# Patient Record
Sex: FEMALE | Race: WHITE | NOT HISPANIC OR LATINO | Employment: OTHER | URBAN - METROPOLITAN AREA
[De-identification: names, ages, dates, MRNs, and addresses within clinical notes are randomized per-mention and may not be internally consistent; named-entity substitution may affect disease eponyms.]

---

## 2017-03-16 ENCOUNTER — HOSPITAL ENCOUNTER (OUTPATIENT)
Dept: MAMMOGRAPHY | Facility: MEDICAL CENTER | Age: 57
Discharge: HOME | End: 2017-03-16
Payer: COMMERCIAL

## 2017-03-16 DIAGNOSIS — Z12.31 VISIT FOR SCREENING MAMMOGRAM: ICD-10-CM

## 2017-03-16 PROCEDURE — 77063 BREAST TOMOSYNTHESIS BI: CPT

## 2017-03-16 PROCEDURE — G0202 SCR MAMMO BI INCL CAD: HCPCS

## 2017-06-09 ENCOUNTER — TRANSCRIBE ORDERS (OUTPATIENT)
Dept: ADMINISTRATIVE | Facility: HOSPITAL | Age: 57
End: 2017-06-09

## 2017-06-09 ENCOUNTER — HOSPITAL ENCOUNTER (OUTPATIENT)
Dept: RADIOLOGY | Facility: MEDICAL CENTER | Age: 57
Discharge: HOME | End: 2017-06-09
Payer: COMMERCIAL

## 2017-06-09 DIAGNOSIS — R07.81 PLEURITIC CHEST PAIN: Primary | ICD-10-CM

## 2017-06-09 DIAGNOSIS — R07.81 PLEURITIC CHEST PAIN: ICD-10-CM

## 2017-06-09 PROCEDURE — 71111 X-RAY EXAM RIBS/CHEST4/> VWS: CPT

## 2017-09-26 ENCOUNTER — GENERIC CONVERSION - ENCOUNTER (OUTPATIENT)
Dept: OTHER | Facility: OTHER | Age: 57
End: 2017-09-26

## 2017-09-26 DIAGNOSIS — C64.1 MALIGNANT NEOPLASM OF RIGHT KIDNEY, EXCEPT RENAL PELVIS (HCC): ICD-10-CM

## 2017-09-26 LAB
BILIRUB UR QL STRIP: NEGATIVE
CLARITY UR: NORMAL
COLOR UR: YELLOW
GLUCOSE (HISTORICAL): NEGATIVE
HGB UR QL STRIP.AUTO: NEGATIVE
KETONES UR STRIP-MCNC: NORMAL MG/DL
LEUKOCYTE ESTERASE UR QL STRIP: NEGATIVE
NITRITE UR QL STRIP: NEGATIVE
PH UR STRIP.AUTO: 7 [PH]
PROT UR STRIP-MCNC: NEGATIVE MG/DL
SP GR UR STRIP.AUTO: 1.01
UROBILINOGEN UR QL STRIP.AUTO: 0.2

## 2018-01-12 VITALS
BODY MASS INDEX: 20.41 KG/M2 | DIASTOLIC BLOOD PRESSURE: 72 MMHG | WEIGHT: 127 LBS | SYSTOLIC BLOOD PRESSURE: 124 MMHG | HEIGHT: 66 IN

## 2018-03-26 ENCOUNTER — OFFICE VISIT (OUTPATIENT)
Dept: UROLOGY | Facility: MEDICAL CENTER | Age: 58
End: 2018-03-26
Payer: COMMERCIAL

## 2018-03-26 ENCOUNTER — TELEPHONE (OUTPATIENT)
Dept: UROLOGY | Facility: AMBULATORY SURGERY CENTER | Age: 58
End: 2018-03-26

## 2018-03-26 VITALS
SYSTOLIC BLOOD PRESSURE: 120 MMHG | DIASTOLIC BLOOD PRESSURE: 70 MMHG | HEIGHT: 67 IN | WEIGHT: 128 LBS | BODY MASS INDEX: 20.09 KG/M2

## 2018-03-26 DIAGNOSIS — C64.1 RENAL CELL CARCINOMA OF RIGHT KIDNEY (HCC): ICD-10-CM

## 2018-03-26 DIAGNOSIS — N30.20 CHRONIC CYSTITIS: ICD-10-CM

## 2018-03-26 DIAGNOSIS — N39.0 URINARY TRACT INFECTION WITHOUT HEMATURIA, SITE UNSPECIFIED: Primary | ICD-10-CM

## 2018-03-26 LAB
SL AMB  POCT GLUCOSE, UA: 100
SL AMB LEUKOCYTE ESTERASE,UA: ABNORMAL
SL AMB POCT BILIRUBIN,UA: ABNORMAL
SL AMB POCT BLOOD,UA: ABNORMAL
SL AMB POCT CLARITY,UA: CLEAR
SL AMB POCT COLOR,UA: ABNORMAL
SL AMB POCT KETONES,UA: ABNORMAL
SL AMB POCT NITRITE,UA: ABNORMAL
SL AMB POCT PH,UA: 5.5
SL AMB POCT SPECIFIC GRAVITY,UA: 1.01
SL AMB POCT URINE PROTEIN: 300
SL AMB POCT UROBILINOGEN: 1

## 2018-03-26 PROCEDURE — 81003 URINALYSIS AUTO W/O SCOPE: CPT | Performed by: UROLOGY

## 2018-03-26 PROCEDURE — 99213 OFFICE O/P EST LOW 20 MIN: CPT | Performed by: UROLOGY

## 2018-03-26 RX ORDER — DIAPER,BRIEF,YOUTH,DISPOSABLE
EACH MISCELLANEOUS
COMMUNITY

## 2018-03-26 RX ORDER — LEVOTHYROXINE SODIUM 50 UG/1
CAPSULE ORAL
COMMUNITY
Start: 2018-02-20 | End: 2020-03-04

## 2018-03-26 RX ORDER — DIPHENHYDRAMINE HYDROCHLORIDE 25 MG/1
5 TABLET ORAL
COMMUNITY
End: 2020-03-04

## 2018-03-26 RX ORDER — CEFUROXIME AXETIL 250 MG/1
250 TABLET ORAL EVERY 12 HOURS SCHEDULED
COMMUNITY
End: 2019-01-10 | Stop reason: ALTCHOICE

## 2018-03-26 RX ORDER — MAG HYDROX/ALUMINUM HYD/SIMETH 400-400-40
SUSPENSION, ORAL (FINAL DOSE FORM) ORAL
COMMUNITY
End: 2018-05-14 | Stop reason: DRUGHIGH

## 2018-03-26 RX ORDER — CEPHALEXIN 250 MG/1
250 CAPSULE ORAL DAILY
Qty: 90 CAPSULE | Refills: 1 | Status: SHIPPED | OUTPATIENT
Start: 2018-03-26 | End: 2018-06-24

## 2018-03-26 NOTE — LETTER
2018     Kody Wong MD  [address]  [address] [facility] [facility] [address]    Patient: Ann House   YOB: 1960   Date of Visit: 3/26/2018       Dear Dr Wanda Dickson: Thank you for referring Ann House to me for evaluation  Below are my notes for this consultation  If you have questions, please do not hesitate to call me  I look forward to following your patient along with you  Sincerely,        Dereck Greenberg MD        CC: MD Dereck Acosta MD  3/26/2018  2:10 PM  Sign at close encounter  [address] [facility]  [place] [phone]  www  [facility]  org      NAME: Ann House  AGE: 63 y o  SEX: female  : 1960   MRN: [id number]    DATE: 3/26/2018  TIME: 1:27 PM    Assessment and Plan  :RCC right kidney- s/p cryo 3/2/2017, doing well- check ROCHELLE and send results to pt  Chronic cystitis- finish Ceftin, start prophylactic Keflex 250mg daily after that finishes for 6 months  Perform cysto in 6 weeks  Chief Complaint   No chief complaint on file  History of Present Illness   Right renal cell carcinoma status post cryoablation in interventional radiology at [facility] 3/2/2017  It was a 1 1 cm mass  Has a UTI- just seen a patient first- 3rd UTI in a Month- she finishes abx and it comes right back  When she starts abx, her sx are relieved in about 24hrs  Sx of infection- dysuria, pressure, general discomfort  E coli UTIs  Not post coital  Has been having some intermittent diarhea for the past 6 months  Had a UTI 1-2 weeks before [place]  Urine culture 3/3/2018 showed E coli greater than 100,000 colonies sensitive to cefazolin, Macrobid and Augmentin  Resistant to Cipro Levaquin and Bactrim, as well as gentamicin  She is currently on Ceftin        The following portions of the patient's history were reviewed and updated as appropriate: allergies, current medications, past family history, past medical history, past social history, past surgical history and problem list     Review of Systems   Review of Systems    Active Problem List   There is no problem list on file for this patient  Objective   There were no vitals taken for this visit  Physical Exam        Current Medications     Current Outpatient Prescriptions:     levothyroxine (SYNTHROID) 75 mcg tablet, Take 75 mcg by mouth daily  , Disp: , Rfl:     LORazepam (ATIVAN) 1 mg tablet, Take 1 mg by mouth every 6 (six) hours as needed for anxiety  , Disp: , Rfl:         Jake Herrera MD

## 2018-03-26 NOTE — TELEPHONE ENCOUNTER
Patient called would like to have ct done then schedule a visit to see the doctor  Is having some uti issues that she is following up with at patient 1st  She would like a call once ct is put in chart  She can be reached at [phone]

## 2018-03-26 NOTE — TELEPHONE ENCOUNTER
Pt is on her 3rd round of ABX's, wants to be seen by Dr Rosibel Obrien  Appt made for today  She will also need a Ct Scan order for 6mo  check  Her last Ct Scan was 10/10/17

## 2018-03-26 NOTE — PROGRESS NOTES
[address] [facility]  [facility]  [address] [facility], [address] [place] [address]  [phone]  www  [facility]  org      NAME: Silverio Reyna  AGE: 63 y o  SEX: female  : 1960   MRN: [id number]    DATE: 3/26/2018  TIME: 1:27 PM    Assessment and Plan  :RCC right kidney- s/p cryo 3/2/2017, doing well- check ROCHELLE and send results to pt  Chronic cystitis- finish Ceftin, start prophylactic Keflex 250mg daily after that finishes for 6 months  Perform cysto in 6 weeks  She requests IV Versed for this, and I told her to make sure she brings a   Chief Complaint   No chief complaint on file  History of Present Illness   Right renal cell carcinoma status post cryoablation in interventional radiology [address] 3/2/2017  It was a 1 1 cm mass  Has a UTI- just seen a patient first- 3rd UTI in a Month- she finishes abx and it comes right back  When she starts abx, her sx are relieved in about 24hrs  Sx of infection- dysuria, pressure, general discomfort  E coli UTIs  Not post coital  Has been having some intermittent diarhea for the past 6 months  Had a UTI 1-2 weeks before [place]  Urine culture 3/3/2018 showed E coli greater than 100,000 colonies sensitive to cefazolin, Macrobid and Augmentin  Resistant to Cipro Levaquin and Bactrim, as well as gentamicin  She is currently on Ceftin  The following portions of the patient's history were reviewed and updated as appropriate: allergies, current medications, past family history, past medical history, past social history, past surgical history and problem list     Review of Systems   Review of Systems    Active Problem List   There is no problem list on file for this patient  Objective   There were no vitals taken for this visit  Physical Exam        Current Medications     Current Outpatient Prescriptions:     levothyroxine (SYNTHROID) 75 mcg tablet, Take 75 mcg by mouth daily  , Disp: , Rfl:   LORazepam (ATIVAN) 1 mg tablet, Take 1 mg by mouth every 6 (six) hours as needed for anxiety  , Disp: , Rfl:         Imelda Washburn MD

## 2018-04-10 ENCOUNTER — HOSPITAL ENCOUNTER (OUTPATIENT)
Dept: ULTRASOUND IMAGING | Facility: MEDICAL CENTER | Age: 58
Discharge: HOME | End: 2018-04-10
Attending: UROLOGY
Payer: COMMERCIAL

## 2018-04-10 DIAGNOSIS — C64.1 RENAL CELL CARCINOMA OF RIGHT KIDNEY (HCC): ICD-10-CM

## 2018-04-10 PROCEDURE — 76770 US EXAM ABDO BACK WALL COMP: CPT

## 2018-05-09 RX ORDER — ESCITALOPRAM OXALATE 10 MG/1
10 TABLET ORAL
COMMUNITY
Start: 2018-04-03 | End: 2019-01-10 | Stop reason: ALTCHOICE

## 2018-05-09 RX ORDER — ZOLPIDEM TARTRATE 10 MG/1
10 TABLET ORAL DAILY
COMMUNITY
Start: 2018-04-03

## 2018-05-14 ENCOUNTER — PROCEDURE VISIT (OUTPATIENT)
Dept: UROLOGY | Facility: MEDICAL CENTER | Age: 58
End: 2018-05-14
Payer: COMMERCIAL

## 2018-05-14 VITALS
HEIGHT: 65 IN | BODY MASS INDEX: 21.83 KG/M2 | DIASTOLIC BLOOD PRESSURE: 80 MMHG | SYSTOLIC BLOOD PRESSURE: 138 MMHG | WEIGHT: 131 LBS

## 2018-05-14 DIAGNOSIS — R10.2 PELVIC PAIN: ICD-10-CM

## 2018-05-14 DIAGNOSIS — N95.2 ATROPHIC VAGINITIS: ICD-10-CM

## 2018-05-14 DIAGNOSIS — C64.1 RENAL CELL CARCINOMA OF RIGHT KIDNEY (HCC): Primary | ICD-10-CM

## 2018-05-14 DIAGNOSIS — N30.20 CHRONIC CYSTITIS: ICD-10-CM

## 2018-05-14 LAB
SL AMB  POCT GLUCOSE, UA: NEGATIVE
SL AMB LEUKOCYTE ESTERASE,UA: NEGATIVE
SL AMB POCT BILIRUBIN,UA: NEGATIVE
SL AMB POCT BLOOD,UA: NEGATIVE
SL AMB POCT CLARITY,UA: CLEAR
SL AMB POCT COLOR,UA: NORMAL
SL AMB POCT KETONES,UA: NEGATIVE
SL AMB POCT NITRITE,UA: NEGATIVE
SL AMB POCT PH,UA: 7.5
SL AMB POCT SPECIFIC GRAVITY,UA: 1.01
SL AMB POCT URINE PROTEIN: NEGATIVE
SL AMB POCT UROBILINOGEN: 0.2

## 2018-05-14 PROCEDURE — 99212 OFFICE O/P EST SF 10 MIN: CPT | Performed by: UROLOGY

## 2018-05-14 PROCEDURE — 81003 URINALYSIS AUTO W/O SCOPE: CPT | Performed by: UROLOGY

## 2018-05-14 RX ORDER — ESTRADIOL 0.1 MG/G
1 CREAM VAGINAL EVERY OTHER DAY
Qty: 42.5 G | Refills: 0 | Status: SHIPPED | OUTPATIENT
Start: 2018-05-14 | End: 2020-09-15

## 2018-05-14 RX ADMIN — MIDAZOLAM HYDROCHLORIDE 4 MG: 5 INJECTION INTRAMUSCULAR; INTRAVENOUS at 16:33

## 2018-05-14 NOTE — PROGRESS NOTES
[address] [facility]  [facility]  [address] [facility]  [place], [address] [place] [address]  [phone]  www  [facility]  org      NAME: Becky Ulloa  AGE: 63 y o  SEX: female  : 1960   MRN: [id number]    DATE: 2018  TIME: 4:35 PM    Assessment and Plan: Start Estrace 1 dab to urethra QOD  Continue with Keflex prophylaxis  See me in 6 months with a renal ultrasound  Chief Complaint     Chief Complaint   Patient presents with    Cystoscopy    Chronic cystitis       History of Present Illness   Chronic cystitis:  Here for cystoscopy  Requires IV Versed  Renal cell carcinoma the right kidney status post cryoablation in 3/2/2017-renal ultrasound shows no recurrence  Cystoscopy Procedure Note        Pre-operative Diagnosis:  Chronic cystitis    Post-operative Diagnosis:  Chronic cystitis, normal bladder on cystoscopy    Procedure: Flexible cystoscopy    Surgeon: Kevin Collado MD    Anesthesia: 1% Xylocaine per urethra/4 mg of Versed given into the right antecubital fossa vein intravenously for sedation    EBL: Minimal    Complications: none    Procedure Details   The risks, benefits, complications, treatment options, and expected outcomes were discussed with the patient  The patient concurred with the proposed plan, giving informed consent  Patient requested intravenous Versed to be given  Using a 25 gauge butterfly needle, I injected 0 8 cc/4 mg of Versed into the right antecubital fossa vein  This gave good sedation and she was conversant the entire procedure  Cystoscopy was performed today under local anesthesia, using sterile technique  The patient was placed in the dorsal lithotomy position, prepped with Betadine, and draped in the usual sterile fashion   The flexible cystocope was used to inspect both the urethra and bladder    Findings:  Urethra:  Normal without stricture, but quite tender    Bladder:  Smooth, not trabeculated and there were no stones tumors or other lesions  The orifices were orthotopic and intact  Specimens:  None                 Complications:  None           Disposition: To home            Condition:  Stable          The following portions of the patient's history were reviewed and updated as appropriate: allergies, current medications, past family history, past medical history, past social history, past surgical history and problem list     Review of Systems   Review of Systems    Active Problem List   There is no problem list on file for this patient  Objective   /80   Ht 5' 5 25" (1 657 m)   Wt 59 4 kg (131 lb)   BMI 21 63 kg/m²     Physical Exam        Current Medications     Current Outpatient Prescriptions:     Biotin 5 MG CAPS, Take 5 mg by mouth, Disp: , Rfl:     cephalexin (KEFLEX) 250 mg capsule, Take 1 capsule (250 mg total) by mouth daily for 90 days, Disp: 90 capsule, Rfl: 1    Cholecalciferol 2000 units CAPS, Take 1 capsule by mouth, Disp: , Rfl:     escitalopram (LEXAPRO) 10 mg tablet, Take 10 mg by mouth, Disp: , Rfl:     LORazepam (ATIVAN) 1 mg tablet, Take 1 mg by mouth every 6 (six) hours as needed for anxiety  , Disp: , Rfl:     Magnesium 100 MG TABS, Take by mouth, Disp: , Rfl:     MULTIPLE VITAMIN PO, Take by mouth, Disp: , Rfl:     Omega-3 Fatty Acids (EQL OMEGA 3 FISH OIL) 1200 MG CAPS, Take by mouth, Disp: , Rfl:     TIROSINT 50 MCG CAPS, , Disp: , Rfl:     cefuroxime (CEFTIN) 250 mg tablet, Take 250 mg by mouth every 12 (twelve) hours, Disp: , Rfl:     zolpidem (AMBIEN) 10 mg tablet, Take 10 mg by mouth daily, Disp: , Rfl:         Amanda Coello MD

## 2018-05-15 RX ORDER — MIDAZOLAM HYDROCHLORIDE 5 MG/ML
4 INJECTION INTRAMUSCULAR; INTRAVENOUS ONCE
Status: COMPLETED | OUTPATIENT
Start: 2018-05-15 | End: 2018-05-14

## 2018-05-30 ENCOUNTER — TRANSCRIBE ORDERS (OUTPATIENT)
Dept: ADMINISTRATIVE | Facility: HOSPITAL | Age: 58
End: 2018-05-30

## 2018-05-30 DIAGNOSIS — Z12.39 SCREENING BREAST EXAMINATION: Primary | ICD-10-CM

## 2018-06-13 ENCOUNTER — HOSPITAL ENCOUNTER (OUTPATIENT)
Dept: ULTRASOUND IMAGING | Facility: MEDICAL CENTER | Age: 58
Discharge: HOME | End: 2018-06-13
Attending: UROLOGY
Payer: COMMERCIAL

## 2018-06-13 ENCOUNTER — HOSPITAL ENCOUNTER (OUTPATIENT)
Dept: MAMMOGRAPHY | Facility: MEDICAL CENTER | Age: 58
Discharge: HOME | End: 2018-06-13
Payer: COMMERCIAL

## 2018-06-13 DIAGNOSIS — Z12.39 SCREENING BREAST EXAMINATION: ICD-10-CM

## 2018-06-13 DIAGNOSIS — R10.2 PELVIC PAIN: ICD-10-CM

## 2018-06-13 PROCEDURE — 77063 BREAST TOMOSYNTHESIS BI: CPT

## 2018-06-13 PROCEDURE — 76856 US EXAM PELVIC COMPLETE: CPT

## 2018-06-13 PROCEDURE — 76830 TRANSVAGINAL US NON-OB: CPT

## 2018-06-13 PROCEDURE — 77067 SCR MAMMO BI INCL CAD: CPT

## 2018-11-15 ENCOUNTER — TELEPHONE (OUTPATIENT)
Dept: UROLOGY | Facility: AMBULATORY SURGERY CENTER | Age: 58
End: 2018-11-15

## 2018-11-15 NOTE — TELEPHONE ENCOUNTER
LMOM, there is a US order in pt's chart from 5/14/18  If she needs it printed to go somewhere other than a [address] [place] [address] facility, please call back

## 2018-11-15 NOTE — TELEPHONE ENCOUNTER
Patient called and said she needs to get order for ultrasound prior to her appointment on 12/06/18    Requesting a call back

## 2018-12-13 ENCOUNTER — HOSPITAL ENCOUNTER (OUTPATIENT)
Dept: ULTRASOUND IMAGING | Facility: MEDICAL CENTER | Age: 58
Discharge: HOME | End: 2018-12-13
Attending: UROLOGY
Payer: COMMERCIAL

## 2018-12-13 DIAGNOSIS — C64.1 RENAL CELL CARCINOMA OF RIGHT KIDNEY (HCC): ICD-10-CM

## 2018-12-13 PROCEDURE — 76770 US EXAM ABDO BACK WALL COMP: CPT

## 2019-01-10 ENCOUNTER — OFFICE VISIT (OUTPATIENT)
Dept: UROLOGY | Facility: MEDICAL CENTER | Age: 59
End: 2019-01-10
Payer: COMMERCIAL

## 2019-01-10 VITALS
WEIGHT: 135 LBS | HEIGHT: 67 IN | BODY MASS INDEX: 21.19 KG/M2 | SYSTOLIC BLOOD PRESSURE: 138 MMHG | HEART RATE: 100 BPM | DIASTOLIC BLOOD PRESSURE: 80 MMHG

## 2019-01-10 DIAGNOSIS — C64.1 RENAL CELL CARCINOMA OF RIGHT KIDNEY (HCC): Primary | ICD-10-CM

## 2019-01-10 LAB
SL AMB  POCT GLUCOSE, UA: ABNORMAL
SL AMB LEUKOCYTE ESTERASE,UA: ABNORMAL
SL AMB POCT BILIRUBIN,UA: ABNORMAL
SL AMB POCT BLOOD,UA: ABNORMAL
SL AMB POCT CLARITY,UA: CLEAR
SL AMB POCT COLOR,UA: YELLOW
SL AMB POCT KETONES,UA: ABNORMAL
SL AMB POCT NITRITE,UA: ABNORMAL
SL AMB POCT PH,UA: 5.5
SL AMB POCT SPECIFIC GRAVITY,UA: 1.01
SL AMB POCT URINE PROTEIN: ABNORMAL
SL AMB POCT UROBILINOGEN: 0.2

## 2019-01-10 PROCEDURE — 99213 OFFICE O/P EST LOW 20 MIN: CPT | Performed by: UROLOGY

## 2019-01-10 PROCEDURE — 81003 URINALYSIS AUTO W/O SCOPE: CPT | Performed by: UROLOGY

## 2019-01-10 RX ORDER — LEVOTHYROXINE SODIUM 50 UG/1
50 CAPSULE ORAL
COMMUNITY
Start: 2019-01-07 | End: 2019-07-22 | Stop reason: SDUPTHER

## 2019-01-10 NOTE — LETTER
January 10, 2019     Siri Blank MD  [address] [facility] [address]    Patient: Jefferson Heredia   YOB: 1960   Date of Visit: 1/10/2019       Dear Dr Lisbet Doll: Thank you for referring Jefferson Heredia to me for evaluation  Below are my notes for this consultation  If you have questions, please do not hesitate to call me  I look forward to following your patient along with you  Sincerely,        Serjio Bowman MD        CC: No Recipients  Serjio Bowman MD  1/10/2019  9:13 AM  Sign at close encounter  [address] [facility]  [address], [address] [place] [phone]  www  [facility]  org      NAME: Jefferson Heredia  AGE: 63 y o  SEX: female  : 1960   MRN: [id number]    DATE: 1/10/2019  TIME: 8:48 AM    Assessment and Plan:    Renal cell carcinoma, right-sided status post cryoablation 2017-no evidence recurrence  Repeat renal ultrasound in 6 months  Chronic cystitis:  Status post negative workup, she is not on Keflex anymore in it turns out she is not on the Estrace either  We will just leave that be and we will restart the Estrace if she starts having problems  Chief Complaint   No chief complaint on file  History of Present Illness   -Renal cell carcinoma right kidney status post cryoablation 3/2/2017  Renal ultrasound shows no change, no recurrence  They mention mild fullness of the upper pole collecting system which is similar to the prior exam   Creatinine was 0 72 as of 2018  She has been experiencing elevations in her ALT and AST and hepatitis workup was negative  Chest x-ray was negative 12/3/2018     -chronic cystitis:  Status post negative cystoscopy May 2018  At that time we started Estrace topically every other day  She was to continue with Keflex prophylaxis which was stopped  before July  Urinalysis is negative        The following portions of the patient's history were reviewed and updated as appropriate: allergies, current medications, past family history, past medical history, past social history, past surgical history and problem list     Review of Systems   Review of Systems   Genitourinary: Negative  Active Problem List   There is no problem list on file for this patient  Objective   There were no vitals taken for this visit  Physical Exam   Constitutional: She is oriented to person, place, and time  She appears well-developed and well-nourished  HENT:   Head: Normocephalic and atraumatic  Eyes: EOM are normal    Neck: Normal range of motion  Pulmonary/Chest: Effort normal    Musculoskeletal: Normal range of motion  Neurological: She is alert and oriented to person, place, and time  Skin: Skin is warm and dry  Psychiatric: She has a normal mood and affect  Her behavior is normal  Judgment and thought content normal            Current Medications     Current Outpatient Prescriptions:     Biotin 5 MG CAPS, Take 5 mg by mouth, Disp: , Rfl:     cefuroxime (CEFTIN) 250 mg tablet, Take 250 mg by mouth every 12 (twelve) hours, Disp: , Rfl:     Cholecalciferol 2000 units CAPS, Take 1 capsule by mouth, Disp: , Rfl:     clonazePAM (KlonoPIN) 0 5 mg tablet, TK 1 T PO BID, Disp: , Rfl: 0    escitalopram (LEXAPRO) 10 mg tablet, Take 10 mg by mouth, Disp: , Rfl:     escitalopram (LEXAPRO) 20 mg tablet, TK 1 T PO QD, Disp: , Rfl: 1    estradiol (ESTRACE) 0 1 mg/g vaginal cream, Insert 1 g into the vagina every other day Do not use applicator  Use 1 finger tip dab to apply the urethra every other day , Disp: 42 5 g, Rfl: 0    FLUARIX QUADRIVALENT 0 5 ML ALEX, inject 0 5 milliliter intramuscularly, Disp: , Rfl: 0    LORazepam (ATIVAN) 1 mg tablet, Take 1 mg by mouth every 6 (six) hours as needed for anxiety  , Disp: , Rfl:     losartan (COZAAR) 25 mg tablet, TK 1 T PO QD, Disp: , Rfl: 0    Magnesium 100 MG TABS, Take by mouth, Disp: , Rfl:     MULTIPLE VITAMIN PO, Take by mouth, Disp: , Rfl:     naltrexone (REVIA) 50 mg tablet, TK 1 T PO D, Disp: , Rfl: 0    Omega-3 Fatty Acids (EQL OMEGA 3 FISH OIL) 1200 MG CAPS, Take by mouth, Disp: , Rfl:     TIROSINT 50 MCG CAPS, , Disp: , Rfl:     valACYclovir (VALTREX) 1,000 mg tablet, take 1 tablet by mouth BEFORE PROCEDURE, ONE TAB THE DAY OF PROCE     (REFER TO PRESCRIPTION NOTES)  , Disp: , Rfl: 0    zolpidem (AMBIEN) 10 mg tablet, Take 10 mg by mouth daily, Disp: , Rfl:         Anne Marquis MD

## 2019-01-10 NOTE — PROGRESS NOTES
[address] [facility]  [facility]  [address] [facility]  [place], [address] [facility]  [phone]  www  [facility]  org      NAME: Naila Salcedo  AGE: 63 y o  SEX: female  : 1960   MRN: [id number]    DATE: 1/10/2019  TIME: 8:48 AM    Assessment and Plan:    Renal cell carcinoma, right-sided status post cryoablation 2017-no evidence recurrence  Repeat renal ultrasound in 6 months  Chronic cystitis:  Status post negative workup, she is not on Keflex anymore in it turns out she is not on the Estrace either  We will just leave that be and we will restart the Estrace if she starts having problems  Chief Complaint   No chief complaint on file  History of Present Illness   -Renal cell carcinoma right kidney status post cryoablation 3/2/2017  Renal ultrasound shows no change, no recurrence  They mention mild fullness of the upper pole collecting system which is similar to the prior exam   Creatinine was 0 72 as of 2018  She has been experiencing elevations in her ALT and AST and hepatitis workup was negative  Chest x-ray was negative 12/3/2018     -chronic cystitis:  Status post negative cystoscopy May 2018  At that time we started Estrace topically every other day  She was to continue with Keflex prophylaxis which was stopped  before July  Urinalysis is negative  The following portions of the patient's history were reviewed and updated as appropriate: allergies, current medications, past family history, past medical history, past social history, past surgical history and problem list     Review of Systems   Review of Systems   Genitourinary: Negative  Active Problem List   There is no problem list on file for this patient  Objective   There were no vitals taken for this visit  Physical Exam   Constitutional: She is oriented to person, place, and time  She appears well-developed and well-nourished     HENT:   Head: Normocephalic and atraumatic  Eyes: EOM are normal    Neck: Normal range of motion  Pulmonary/Chest: Effort normal    Musculoskeletal: Normal range of motion  Neurological: She is alert and oriented to person, place, and time  Skin: Skin is warm and dry  Psychiatric: She has a normal mood and affect  Her behavior is normal  Judgment and thought content normal            Current Medications     Current Outpatient Prescriptions:     Biotin 5 MG CAPS, Take 5 mg by mouth, Disp: , Rfl:     cefuroxime (CEFTIN) 250 mg tablet, Take 250 mg by mouth every 12 (twelve) hours, Disp: , Rfl:     Cholecalciferol 2000 units CAPS, Take 1 capsule by mouth, Disp: , Rfl:     clonazePAM (KlonoPIN) 0 5 mg tablet, TK 1 T PO BID, Disp: , Rfl: 0    escitalopram (LEXAPRO) 10 mg tablet, Take 10 mg by mouth, Disp: , Rfl:     escitalopram (LEXAPRO) 20 mg tablet, TK 1 T PO QD, Disp: , Rfl: 1    estradiol (ESTRACE) 0 1 mg/g vaginal cream, Insert 1 g into the vagina every other day Do not use applicator  Use 1 finger tip dab to apply the urethra every other day , Disp: 42 5 g, Rfl: 0    FLUARIX QUADRIVALENT 0 5 ML ALEX, inject 0 5 milliliter intramuscularly, Disp: , Rfl: 0    LORazepam (ATIVAN) 1 mg tablet, Take 1 mg by mouth every 6 (six) hours as needed for anxiety  , Disp: , Rfl:     losartan (COZAAR) 25 mg tablet, TK 1 T PO QD, Disp: , Rfl: 0    Magnesium 100 MG TABS, Take by mouth, Disp: , Rfl:     MULTIPLE VITAMIN PO, Take by mouth, Disp: , Rfl:     naltrexone (REVIA) 50 mg tablet, TK 1 T PO D, Disp: , Rfl: 0    Omega-3 Fatty Acids (EQL OMEGA 3 FISH OIL) 1200 MG CAPS, Take by mouth, Disp: , Rfl:     TIROSINT 50 MCG CAPS, , Disp: , Rfl:     valACYclovir (VALTREX) 1,000 mg tablet, take 1 tablet by mouth BEFORE PROCEDURE, ONE TAB THE DAY OF PROCE     (REFER TO PRESCRIPTION NOTES)  , Disp: , Rfl: 0    zolpidem (AMBIEN) 10 mg tablet, Take 10 mg by mouth daily, Disp: , Rfl:         Denny Quick MD

## 2019-05-15 ENCOUNTER — TRANSCRIBE ORDERS (OUTPATIENT)
Dept: ADMINISTRATIVE | Facility: HOSPITAL | Age: 59
End: 2019-05-15

## 2019-05-15 ENCOUNTER — HOSPITAL ENCOUNTER (OUTPATIENT)
Dept: ULTRASOUND IMAGING | Facility: CLINIC | Age: 59
Discharge: HOME | End: 2019-05-15
Payer: COMMERCIAL

## 2019-05-15 ENCOUNTER — HOSPITAL ENCOUNTER (OUTPATIENT)
Dept: MAMMOGRAPHY | Facility: CLINIC | Age: 59
Discharge: HOME | End: 2019-05-15
Payer: COMMERCIAL

## 2019-05-15 VITALS — WEIGHT: 144 LBS | BODY MASS INDEX: 23.99 KG/M2 | HEIGHT: 65 IN

## 2019-05-15 DIAGNOSIS — N63.25 BREAST LUMP ON LEFT SIDE AT 3 O'CLOCK POSITION: ICD-10-CM

## 2019-05-15 DIAGNOSIS — N63.0 BREAST LUMP: ICD-10-CM

## 2019-05-15 DIAGNOSIS — N63.25 BREAST LUMP ON LEFT SIDE AT 3 O'CLOCK POSITION: Primary | ICD-10-CM

## 2019-05-15 DIAGNOSIS — N63.20 LEFT BREAST MASS: ICD-10-CM

## 2019-05-15 PROCEDURE — 77066 DX MAMMO INCL CAD BI: CPT

## 2019-05-15 PROCEDURE — 76642 ULTRASOUND BREAST LIMITED: CPT

## 2019-05-15 PROCEDURE — G0279 TOMOSYNTHESIS, MAMMO: HCPCS

## 2019-07-10 ENCOUNTER — TELEPHONE (OUTPATIENT)
Dept: UROLOGY | Facility: CLINIC | Age: 59
End: 2019-07-10

## 2019-07-16 ENCOUNTER — TELEPHONE (OUTPATIENT)
Dept: UROLOGY | Facility: CLINIC | Age: 59
End: 2019-07-16

## 2019-07-17 ENCOUNTER — HOSPITAL ENCOUNTER (OUTPATIENT)
Dept: ULTRASOUND IMAGING | Facility: MEDICAL CENTER | Age: 59
Discharge: HOME | End: 2019-07-17
Attending: UROLOGY
Payer: COMMERCIAL

## 2019-07-17 DIAGNOSIS — C64.1 RENAL CELL CARCINOMA OF RIGHT KIDNEY (HCC): ICD-10-CM

## 2019-07-17 PROCEDURE — 76770 US EXAM ABDO BACK WALL COMP: CPT

## 2019-07-17 RX ORDER — TRIAMCINOLONE ACETONIDE 1 MG/G
CREAM TOPICAL
COMMUNITY
Start: 2019-06-17

## 2019-07-17 RX ORDER — GABAPENTIN 300 MG/1
CAPSULE ORAL
COMMUNITY
Start: 2019-06-18

## 2019-07-17 RX ORDER — TRAZODONE HYDROCHLORIDE 100 MG/1
TABLET ORAL
COMMUNITY
Start: 2019-07-05

## 2019-07-17 RX ORDER — IBUPROFEN 600 MG/1
TABLET ORAL
COMMUNITY
Start: 2019-06-25

## 2019-07-22 ENCOUNTER — OFFICE VISIT (OUTPATIENT)
Dept: UROLOGY | Facility: MEDICAL CENTER | Age: 59
End: 2019-07-22
Payer: COMMERCIAL

## 2019-07-22 VITALS
WEIGHT: 137 LBS | DIASTOLIC BLOOD PRESSURE: 78 MMHG | HEIGHT: 65 IN | BODY MASS INDEX: 22.82 KG/M2 | SYSTOLIC BLOOD PRESSURE: 110 MMHG | HEART RATE: 88 BPM

## 2019-07-22 DIAGNOSIS — C64.1 RENAL CELL CARCINOMA OF RIGHT KIDNEY (HCC): Primary | ICD-10-CM

## 2019-07-22 PROCEDURE — 99213 OFFICE O/P EST LOW 20 MIN: CPT | Performed by: UROLOGY

## 2019-07-22 NOTE — PROGRESS NOTES
[address] [facility]  [facility]  [address] [facility]  [place], [address] [facility]  [phone]  www  [facility]  org      NAME: Abraham Rockwell  AGE: 63 y o  SEX: female  : 1960   MRN: [id number]    DATE: 2019  TIME: 4:48 PM    Assessment and Plan:    Renal cell carcinoma right kidney over 2 years status post cryoablation of 1 1 cm mass, good result  Because her last CT scan was in , we will repeat this in 2019  This will be abdomen and pelvis with and without contrast   I will send her the results and I will see her in 1 year  Chief Complaint     Chief Complaint   Patient presents with    Renal Cancer       History of Present Illness   Renal cell carcinoma right kidney:  Status post cryoablation 3/2/2017 of a 1 1 cm mass, renal ultrasound was reviewed by me and the patient with the actual images and shows no recurrence  There is a divot where the tumor was previously in the upper pole  This shows scarring only  Left kidney normal       The following portions of the patient's history were reviewed and updated as appropriate: allergies, current medications, past family history, past medical history, past social history, past surgical history and problem list   Past Medical History:   Diagnosis Date    Anxiety     Disease of thyroid gland      Past Surgical History:   Procedure Laterality Date    HYSTERECTOMY       shoulder  Review of Systems   Review of Systems    Active Problem List   There is no problem list on file for this patient        Objective   /78   Pulse 88   Ht 5' 5" (1 651 m)   Wt 62 1 kg (137 lb)   BMI 22 80 kg/m²     Physical Exam        Current Medications     Current Outpatient Medications:     Biotin 5 MG CAPS, Take 5 mg by mouth, Disp: , Rfl:     Cholecalciferol 2000 units CAPS, Take 1 capsule by mouth, Disp: , Rfl:     escitalopram (LEXAPRO) 20 mg tablet, TK 1 T PO QD, Disp: , Rfl: 1   gabapentin (NEURONTIN) 300 mg capsule, , Disp: , Rfl:     ibuprofen (MOTRIN) 600 mg tablet, , Disp: , Rfl:     losartan (COZAAR) 25 mg tablet, TK 1 T PO QD, Disp: , Rfl: 0    Magnesium 100 MG TABS, Take by mouth, Disp: , Rfl:     MULTIPLE VITAMIN PO, Take by mouth, Disp: , Rfl:     TIROSINT 50 MCG CAPS, , Disp: , Rfl:     traZODone (DESYREL) 100 mg tablet, , Disp: , Rfl:     clonazePAM (KlonoPIN) 0 5 mg tablet, TK 1 T PO BID, Disp: , Rfl: 0    estradiol (ESTRACE) 0 1 mg/g vaginal cream, Insert 1 g into the vagina every other day Do not use applicator  Use 1 finger tip dab to apply the urethra every other day  (Patient not taking: Reported on 1/10/2019 ), Disp: 42 5 g, Rfl: 0    FLUARIX QUADRIVALENT 0 5 ML ALEX, inject 0 5 milliliter intramuscularly, Disp: , Rfl: 0    LORazepam (ATIVAN) 1 mg tablet, Take 1 mg by mouth every 6 (six) hours as needed for anxiety  , Disp: , Rfl:     Omega-3 Fatty Acids (EQL OMEGA 3 FISH OIL) 1200 MG CAPS, Take by mouth, Disp: , Rfl:     triamcinolone (KENALOG) 0 1 % cream, , Disp: , Rfl:     valACYclovir (VALTREX) 1,000 mg tablet, take 1 tablet by mouth BEFORE PROCEDURE, ONE TAB THE DAY OF PROCE     (REFER TO PRESCRIPTION NOTES)  , Disp: , Rfl: 0    zolpidem (AMBIEN) 10 mg tablet, Take 10 mg by mouth daily, Disp: , Rfl:         Minda Díaz MD

## 2019-08-27 ENCOUNTER — OFFICE VISIT (OUTPATIENT)
Dept: ENDOCRINOLOGY | Facility: CLINIC | Age: 59
End: 2019-08-27
Payer: COMMERCIAL

## 2019-08-27 ENCOUNTER — LAB (OUTPATIENT)
Dept: LAB | Facility: CLINIC | Age: 59
End: 2019-08-27
Payer: COMMERCIAL

## 2019-08-27 VITALS
WEIGHT: 132 LBS | BODY MASS INDEX: 21.99 KG/M2 | HEART RATE: 60 BPM | DIASTOLIC BLOOD PRESSURE: 60 MMHG | HEIGHT: 65 IN | SYSTOLIC BLOOD PRESSURE: 110 MMHG

## 2019-08-27 DIAGNOSIS — E03.9 ACQUIRED HYPOTHYROIDISM: ICD-10-CM

## 2019-08-27 DIAGNOSIS — E03.9 ACQUIRED HYPOTHYROIDISM: Primary | ICD-10-CM

## 2019-08-27 DIAGNOSIS — R19.7 DIARRHEA, UNSPECIFIED TYPE: ICD-10-CM

## 2019-08-27 LAB
T4 FREE SERPL-MCNC: 0.82 NG/DL (ref 0.76–1.46)
TSH SERPL DL<=0.05 MIU/L-ACNC: 1.52 UIU/ML (ref 0.36–3.74)

## 2019-08-27 PROCEDURE — 84443 ASSAY THYROID STIM HORMONE: CPT

## 2019-08-27 PROCEDURE — 99244 OFF/OP CNSLTJ NEW/EST MOD 40: CPT | Performed by: INTERNAL MEDICINE

## 2019-08-27 PROCEDURE — 36415 COLL VENOUS BLD VENIPUNCTURE: CPT

## 2019-08-27 PROCEDURE — 84439 ASSAY OF FREE THYROXINE: CPT

## 2019-08-27 NOTE — PATIENT INSTRUCTIONS
Hypothyroidism   WHAT YOU NEED TO KNOW:   What is hypothyroidism? Hypothyroidism is a condition that develops when the thyroid gland does not make enough thyroid hormone  Thyroid hormones help control body temperature, heart rate, growth, and weight  What causes hypothyroidism? If you have a family member with hypothyroidism, your risk is increased  Any of the following can cause hypothyroidism:  · Autoimmune disease, such as inflammation of your thyroid, or Hashimoto disease    · Surgery, radiation therapy, or medicines such as lithium, sedatives, or narcotics    · Thyroid cancer or viral infection    · Low iodine levels  What are the signs and symptoms of hypothyroidism? The signs and symptoms may develop slowly, sometimes over several years  · Exhaustion    · Sensitivity to cold    · Headaches or decreased concentration    · Muscle aches or weakness    · Constipation     · Dry, flaky skin or brittle nails    · Thinning hair    · Heavy or irregular monthly periods    · Depression or irritability  How is hypothyroidism diagnosed? Your healthcare provider will ask about your symptoms and what medicines you take  He will ask about your medical history and if anyone in your family has hypothyroidism  A blood test will show your thyroid hormone level  How is hypothyroidism treated? Thyroid hormone replacement medicine may bring your thyroid hormone level back to normal  Ask your healthcare provider for more information on other medicines you may need  Call 911 for any of the following:   · You have sudden chest pain or shortness of breath  · You have a seizure  · You feel like you are going to faint  When should I seek immediate care? · You have diarrhea, tremors, or trouble sleeping  · Your legs, ankles, or feet are swollen  When should I contact my healthcare provider? · You have a fever  · You have chills, a cough, or feel weak and achy      · You have pain and swelling in your muscles and joints  · Your skin is itchy, swollen, or you have a rash  · Your signs and symptoms return or get worse, even after treatment  · You have questions or concerns about your condition or care  CARE AGREEMENT:   You have the right to help plan your care  Learn about your health condition and how it may be treated  Discuss treatment options with your caregivers to decide what care you want to receive  You always have the right to refuse treatment  The above information is an  only  It is not intended as medical advice for individual conditions or treatments  Talk to your doctor, nurse or pharmacist before following any medical regimen to see if it is safe and effective for you  © 2017 [facility] Information is for End User's use only and may not be sold, redistributed or otherwise used for commercial purposes  All illustrations and images included in CareNotes® are the copyrighted property of A D A M , Inc  or Andreas Bennett

## 2019-08-27 NOTE — PROGRESS NOTES
Blanca Mcpherson 63 y o  female MRN: [id number]    Encounter: [id number]      Assessment/Plan     Assessment: This is a 63y o -year-old female with hypothyroidism and diarrhea  Plan:  1  Acquired hypothyroidism-check TSH and free T4  Based on results, she may need adjustment in her thyroid hormone supplementation  2  Diarrhea-I have referred her to Gastroenterology  CC:   Hypothyroidism    History of Present Illness     HPI:  60-year-old woman with hypothyroidism for about eight years who is currently on Tirosint 50 mcg per day  She denies any constipation but does admit to diarrhea  She admits to the heat intolerance, flaky skin but denies any hair loss  She does not know of any family history of thyroid disorders  She states that her thyroid levels have fluctuated in the past     Review of Systems   Constitutional: Negative for chills and fever  Respiratory: Negative for shortness of breath  Cardiovascular: Negative for chest pain  Gastrointestinal: Positive for diarrhea  Negative for constipation, nausea and vomiting  Endocrine: Positive for heat intolerance  Negative for cold intolerance  All other systems reviewed and are negative        Historical Information   Past Medical History:   Diagnosis Date    Anxiety     Disease of thyroid gland      Past Surgical History:   Procedure Laterality Date    [facility]ERECTOMY  [facility]     Social History   Social History     Substance and Sexual Activity   Alcohol Use No     Social History     Substance and Sexual Activity   Drug Use No     Social History     Tobacco Use   Smoking Status Never Smoker   Smokeless Tobacco Never Used     Family History:   Family History   Problem Relation Age of Onset    Lung cancer Father     No Known Problems Sister     No Known Problems Daughter     No Known Problems Paternal Aunt     No Known Problems Sister     No Known Problems Sister     No Known Problems Daughter        Meds/Allergies   Current Outpatient Medications   Medication Sig Dispense Refill    Biotin 5 MG CAPS Take 5 mg by mouth      escitalopram (LEXAPRO) 20 mg tablet TK 1 T PO QD  1    gabapentin (NEURONTIN) 300 mg capsule       ibuprofen (MOTRIN) 600 mg tablet       Magnesium 100 MG TABS Take by mouth      TIROSINT 50 MCG CAPS       triamcinolone (KENALOG) 0 1 % cream       valACYclovir (VALTREX) 1,000 mg tablet take 1 tablet by mouth BEFORE PROCEDURE, ONE TAB THE DAY OF PROCE     (REFER TO PRESCRIPTION NOTES)  0    Cholecalciferol 2000 units CAPS Take 1 capsule by mouth      clonazePAM (KlonoPIN) 0 5 mg tablet TK 1 T PO BID  0    estradiol (ESTRACE) 0 1 mg/g vaginal cream Insert 1 g into the vagina every other day Do not use applicator  Use 1 finger tip dab to apply the urethra every other day  (Patient not taking: Reported on 1/10/2019 ) 42 5 g 0    FLUARIX QUADRIVALENT 0 5 ML ALEX inject 0 5 milliliter intramuscularly  0    LORazepam (ATIVAN) 1 mg tablet Take 1 mg by mouth every 6 (six) hours as needed for anxiety   losartan (COZAAR) 25 mg tablet TK 1 T PO QD  0    MULTIPLE VITAMIN PO Take by mouth      Omega-3 Fatty Acids (EQL OMEGA 3 FISH OIL) 1200 MG CAPS Take by mouth      traZODone (DESYREL) 100 mg tablet       zolpidem (AMBIEN) 10 mg tablet Take 10 mg by mouth daily       No current facility-administered medications for this visit  No Known Allergies    Objective   Vitals: Blood pressure 110/60, pulse 60, height 5' 5" (1 651 m), weight 59 9 kg (132 lb)  Physical Exam   Constitutional: She is oriented to person, place, and time  She appears well-developed and well-nourished  No distress  HENT:   Head: Normocephalic and atraumatic  Mouth/Throat: Oropharynx is clear and moist and mucous membranes are normal  No oropharyngeal exudate  Eyes: Conjunctivae, EOM and lids are normal  Right eye exhibits no discharge  Left eye exhibits no discharge  No scleral icterus  Neck: Neck supple  No thyromegaly present  Cardiovascular: Normal rate, regular rhythm and normal heart sounds  Exam reveals no gallop and no friction rub  No murmur heard  Pulmonary/Chest: Effort normal and breath sounds normal  No respiratory distress  She has no wheezes  Abdominal: Soft  Bowel sounds are normal  She exhibits no distension  There is no tenderness  Musculoskeletal: Normal range of motion  She exhibits no edema, tenderness or deformity  Lymphadenopathy:        Head (right side): No occipital adenopathy present  Head (left side): No occipital adenopathy present  Right: No supraclavicular adenopathy present  Left: No supraclavicular adenopathy present  Neurological: She is alert and oriented to person, place, and time  No cranial nerve deficit  Skin: Skin is warm and intact  No rash noted  She is not diaphoretic  No erythema  Psychiatric: She has a normal mood and affect  Her behavior is normal    Vitals reviewed  The history was obtained from the review of the chart, patient  Portions of the record may have been created with voice recognition software  Occasional wrong word or "sound a like" substitutions may have occurred due to the inherent limitations of voice recognition software  Read the chart carefully and recognize, using context, where substitutions have occurred

## 2019-08-28 ENCOUNTER — TELEPHONE (OUTPATIENT)
Dept: ENDOCRINOLOGY | Facility: CLINIC | Age: 59
End: 2019-08-28

## 2019-08-28 NOTE — TELEPHONE ENCOUNTER
----- Message from Veronica Villalba MD sent at 8/28/2019  7:31 AM EDT -----  The laboratory testing results are normal

## 2019-09-11 ENCOUNTER — TELEPHONE (OUTPATIENT)
Dept: ENDOCRINOLOGY | Facility: CLINIC | Age: 59
End: 2019-09-11

## 2019-09-11 ENCOUNTER — OFFICE VISIT (OUTPATIENT)
Dept: OBGYN CLINIC | Facility: CLINIC | Age: 59
End: 2019-09-11
Payer: COMMERCIAL

## 2019-09-11 VITALS
DIASTOLIC BLOOD PRESSURE: 70 MMHG | HEIGHT: 65 IN | WEIGHT: 128.6 LBS | BODY MASS INDEX: 21.43 KG/M2 | SYSTOLIC BLOOD PRESSURE: 120 MMHG

## 2019-09-11 DIAGNOSIS — Z11.51 SCREENING FOR HUMAN PAPILLOMAVIRUS (HPV): ICD-10-CM

## 2019-09-11 DIAGNOSIS — Z01.419 ENCOUNTER FOR GYNECOLOGICAL EXAMINATION (GENERAL) (ROUTINE) WITHOUT ABNORMAL FINDINGS: Primary | ICD-10-CM

## 2019-09-11 DIAGNOSIS — Z12.31 ENCOUNTER FOR SCREENING MAMMOGRAM FOR MALIGNANT NEOPLASM OF BREAST: ICD-10-CM

## 2019-09-11 DIAGNOSIS — Z12.4 SCREENING FOR CERVICAL CANCER: ICD-10-CM

## 2019-09-11 PROCEDURE — 99386 PREV VISIT NEW AGE 40-64: CPT | Performed by: OBSTETRICS & GYNECOLOGY

## 2019-09-11 NOTE — TELEPHONE ENCOUNTER
Patient just stopped by to let you know that your recommendation for her to see Dr Luma Kimbrough (sp?), OB-GYN, was wonderful  She said that Dr Luma Kimbrough was amazing and she said she wanted to give you a hug and a kiss to thank you for recommending her  So thank you a ton

## 2019-09-12 NOTE — PROGRESS NOTES
Assessment/Plan     Diagnoses and all orders for this visit:    Encounter for gynecological examination (general) (routine) without abnormal findings    Encounter for screening mammogram for malignant neoplasm of breast  -     Mammo screening bilateral w 3d & cad; Future    Screening for cervical cancer    Screening for human papillomavirus (HPV)    Other orders  -     Cancel: Liquid-based pap, screening             Subjective      Alex Sarah is a 63 y o  female who presents for annual exam  The patient has no complaints today  The patient is sexually active  GYN screening history: last pap: was normal and last mammogram: was normal  The patient has never been taking hormone replacement therapy  Patient denies post-menopausal vaginal bleeding    The patient participates in regular exercise: yes  The patient reports some vaginal dryness and pain with deep palpation on exams - but not with intercourse  Menstrual History:  OB History        3    Para   3    Term   3            AB        Living           SAB        TAB        Ectopic        Multiple        Live Births                   No LMP recorded  Patient has had a hysterectomy  The following portions of the patient's history were reviewed and updated as appropriate: allergies, current medications, past family history, past medical history, past social history, past surgical history and problem list     Review of Systems  Pertinent items are noted in HPI       Objective      /70 (BP Location: Right arm, Patient Position: Sitting, Cuff Size: Standard)   Ht 5' 5" (1 651 m)   Wt 58 3 kg (128 lb 9 6 oz)   BMI 21 40 kg/m²     General:   alert and oriented, in no acute distress   Heart: regular rate and rhythm, S1, S2 normal, no murmur, click, rub or gallop   Lungs: clear to auscultation bilaterally   Abdomen: soft, non-tender, without masses or organomegaly   Vulva: normal   Vagina: No lesions, dry, thin   Cervix: absent   Uterus: surgically absent   Adnexa: surgically absent

## 2019-11-22 ENCOUNTER — TELEPHONE (OUTPATIENT)
Dept: UROLOGY | Facility: MEDICAL CENTER | Age: 59
End: 2019-11-22

## 2019-11-22 NOTE — TELEPHONE ENCOUNTER
Patient of Dr Jl Holloway in [facility]  Patient had a question about her script for blood work and where to  her barium for her cat scan  She is going to call her insurance to see if she can go to [facility]  If she needs to go to [facility] she will call back with the fax number and then we can fax the script over

## 2019-11-26 ENCOUNTER — TRANSCRIBE ORDERS (OUTPATIENT)
Dept: ADMINISTRATIVE | Facility: HOSPITAL | Age: 59
End: 2019-11-26

## 2019-11-26 NOTE — TELEPHONE ENCOUNTER
Patient managed by Dorota Jauregui is calling to speak with nurse  She wants Dr Dorota Jauregui to specifically say if she needs IV contrast   Did speak with Magdalena and gave instructions on with and without contrast per Dr Dorota Jauregui notes  She was not satisfied with that answer and would like to get a call back

## 2019-11-26 NOTE — TELEPHONE ENCOUNTER
Spoke with pt and explained her that I do want p o  And IV contrast   The person from Radiology who she spoke with said there was only an order for IV contrast but I checked the order myself and says both p o  And IV in the order

## 2019-11-29 LAB — HBA1C MFR BLD HPLC: 5.5 %

## 2019-11-29 NOTE — TELEPHONE ENCOUNTER
Unfortunately authorization for the CT has been denied by [employer]  I spoke with patient and explained this and stated we would need to continue to attempt to get this approved and then get CT rescheduled once approved  Appointment for Monday 12/2/19 for CT has been cancelled   PT wanted me to inform Dr Anthony Connell of the denial

## 2019-12-03 ENCOUNTER — TELEPHONE (OUTPATIENT)
Dept: UROLOGY | Facility: AMBULATORY SURGERY CENTER | Age: 59
End: 2019-12-03

## 2019-12-03 DIAGNOSIS — C64.9 RENAL CELL CARCINOMA, UNSPECIFIED LATERALITY (HCC): Primary | ICD-10-CM

## 2019-12-03 DIAGNOSIS — C64.1 RENAL CELL CARCINOMA OF RIGHT KIDNEY (HCC): Primary | ICD-10-CM

## 2019-12-03 NOTE — TELEPHONE ENCOUNTER
This patients CT scan abdomen and pelvis w/wo contrast was denied with the insurance  The insurance will approve a CT abdomen w/wo contrast, if Dr Estrada is ok with changing this please add a new order into the chart and let me know  If he is not a peer to peer will need to be done    Thanks  [employer]

## 2019-12-03 NOTE — TELEPHONE ENCOUNTER
I spoke with Debora Roy and let her know the insurance would only approve a CT scan abdomen w/wo contrast and the order was changed  I transferred her over to central scheduling to schedule this

## 2019-12-07 ENCOUNTER — HOSPITAL ENCOUNTER (OUTPATIENT)
Dept: CT IMAGING | Facility: HOSPITAL | Age: 59
Discharge: HOME | End: 2019-12-07
Attending: UROLOGY
Payer: COMMERCIAL

## 2019-12-07 DIAGNOSIS — C64.9 RENAL CELL CARCINOMA, UNSPECIFIED LATERALITY (HCC): ICD-10-CM

## 2019-12-07 PROCEDURE — 74170 CT ABD WO CNTRST FLWD CNTRST: CPT

## 2019-12-07 RX ADMIN — IOHEXOL 100 ML: 350 INJECTION, SOLUTION INTRAVENOUS at 07:55

## 2020-02-28 ENCOUNTER — LAB (OUTPATIENT)
Dept: LAB | Facility: MEDICAL CENTER | Age: 60
End: 2020-02-28
Payer: COMMERCIAL

## 2020-02-28 DIAGNOSIS — E03.9 ACQUIRED HYPOTHYROIDISM: Primary | ICD-10-CM

## 2020-02-28 DIAGNOSIS — E03.9 ACQUIRED HYPOTHYROIDISM: ICD-10-CM

## 2020-02-28 LAB
T4 FREE SERPL-MCNC: 0.81 NG/DL (ref 0.76–1.46)
TSH SERPL DL<=0.05 MIU/L-ACNC: 3.57 UIU/ML (ref 0.36–3.74)

## 2020-02-28 PROCEDURE — 36415 COLL VENOUS BLD VENIPUNCTURE: CPT

## 2020-02-28 PROCEDURE — 84443 ASSAY THYROID STIM HORMONE: CPT

## 2020-02-28 PROCEDURE — 84439 ASSAY OF FREE THYROXINE: CPT

## 2020-03-02 ENCOUNTER — TELEPHONE (OUTPATIENT)
Dept: ENDOCRINOLOGY | Facility: CLINIC | Age: 60
End: 2020-03-02

## 2020-03-02 NOTE — RESULT ENCOUNTER NOTE
The laboratory testing results are normal   Plan to discuss further at the visit later this week      Sent to my chart

## 2020-03-02 NOTE — TELEPHONE ENCOUNTER
----- Message from Catalino Mclain MD sent at 3/2/2020  9:44 AM EST -----  The laboratory testing results are normal   Plan to discuss further at the visit later this week      Sent to my chart

## 2020-03-04 ENCOUNTER — OFFICE VISIT (OUTPATIENT)
Dept: ENDOCRINOLOGY | Facility: CLINIC | Age: 60
End: 2020-03-04
Payer: COMMERCIAL

## 2020-03-04 VITALS
WEIGHT: 126.2 LBS | HEIGHT: 65 IN | BODY MASS INDEX: 21.02 KG/M2 | SYSTOLIC BLOOD PRESSURE: 158 MMHG | HEART RATE: 84 BPM | DIASTOLIC BLOOD PRESSURE: 92 MMHG

## 2020-03-04 DIAGNOSIS — E03.9 ACQUIRED HYPOTHYROIDISM: Primary | ICD-10-CM

## 2020-03-04 PROCEDURE — 99214 OFFICE O/P EST MOD 30 MIN: CPT | Performed by: INTERNAL MEDICINE

## 2020-03-04 RX ORDER — LEVOTHYROXINE SODIUM 100 UG/1
100 CAPSULE ORAL DAILY
Qty: 90 CAPSULE | Refills: 3 | Status: SHIPPED | OUTPATIENT
Start: 2020-03-04

## 2020-03-04 NOTE — PROGRESS NOTES
Heraclio Moore 62 y o  female MRN: [id number]    Encounter: [id number]      Assessment/Plan     Assessment: This is a 62y o -year-old female with hypothyroidism  Plan:  1  Hypothyroidism-her thyroid function testing is still not in the target range which is TSH of 1 to 2  Increase Tirosint to 100 mcg per day  Check TSH and free T4 in six weeks  2  Diarrhea-this has improved  She does plan to have another colonoscopy  CC:   Hypothyroidism    History of Present Illness     HPI:  64-year-old woman with hypothyroidism for several years who is currently on Tirosint 50 mcg per day  She does complain of fatigue but denies any other symptoms of hypo or hyperthyroidism  She states that she is going through some stress with one of her friend dying of cancer and another for an relapsed for drug and alcohol use  She does have some joint aches  At her last visit, we had discussed seeing a gastroenterologist for diarrhea  She states that the diarrhea has improved  Review of Systems   Constitutional: Positive for fatigue  Negative for chills and fever  Respiratory: Negative for shortness of breath  Cardiovascular: Negative for chest pain  Gastrointestinal: Negative for constipation, diarrhea, nausea and vomiting  All other systems reviewed and are negative        Historical Information   Past Medical History:   Diagnosis Date    Anxiety     Disease of thyroid gland      Past Surgical History:   Procedure Laterality Date    HYSTERECTOMY  1993    RENAL CRYOABLATION Right 08/30/2016     Social History   Social History     Substance and Sexual Activity   Alcohol Use No     Social History     Substance and Sexual Activity   Drug Use No     Social History     Tobacco Use   Smoking Status Never Smoker   Smokeless Tobacco Never Used     Family History:   Family History   Problem Relation Age of Onset    Kidney disease Mother 28    Lung cancer Father     Anxiety disorder Sister     No Known Problems Daughter     No Known Problems Paternal Aunt     Anxiety disorder Sister     Anxiety disorder Sister     No Known Problems Daughter     Anxiety disorder Brother        Meds/Allergies   Current Outpatient Medications   Medication Sig Dispense Refill    Cholecalciferol 2000 units CAPS Take 1 capsule by mouth      gabapentin (NEURONTIN) 300 mg capsule       ibuprofen (MOTRIN) 600 mg tablet       Magnesium 100 MG TABS Take by mouth      TIROSINT 50 MCG CAPS       Biotin 5 MG CAPS Take 5 mg by mouth      clonazePAM (KlonoPIN) 0 5 mg tablet TK 1 T PO BID  0    escitalopram (LEXAPRO) 20 mg tablet TK 1 T PO QD  1    estradiol (ESTRACE) 0 1 mg/g vaginal cream Insert 1 g into the vagina every other day Do not use applicator  Use 1 finger tip dab to apply the urethra every other day  (Patient not taking: Reported on 1/10/2019 ) 42 5 g 0    FLUARIX QUADRIVALENT 0 5 ML ALEX inject 0 5 milliliter intramuscularly  0    LORazepam (ATIVAN) 1 mg tablet Take 1 mg by mouth every 6 (six) hours as needed for anxiety   losartan (COZAAR) 25 mg tablet TK 1 T PO QD  0    MULTIPLE VITAMIN PO Take by mouth      Omega-3 Fatty Acids (EQL OMEGA 3 FISH OIL) 1200 MG CAPS Take by mouth      traZODone (DESYREL) 100 mg tablet       triamcinolone (KENALOG) 0 1 % cream       valACYclovir (VALTREX) 1,000 mg tablet take 1 tablet by mouth BEFORE PROCEDURE, ONE TAB THE DAY OF PROCE     (REFER TO PRESCRIPTION NOTES)  0    zolpidem (AMBIEN) 10 mg tablet Take 10 mg by mouth daily       No current facility-administered medications for this visit  No Known Allergies    Objective   Vitals: Blood pressure 158/92, pulse 84, height 5' 5" (1 651 m), weight 57 2 kg (126 lb 3 2 oz)  Physical Exam   Constitutional: She is oriented to person, place, and time  She appears well-developed and well-nourished  No distress  HENT:   Head: Normocephalic and atraumatic     Mouth/Throat: Oropharynx is clear and moist and mucous membranes are normal  No oropharyngeal exudate  Eyes: Conjunctivae, EOM and lids are normal  Right eye exhibits no discharge  Left eye exhibits no discharge  No scleral icterus  Neck: Neck supple  No thyromegaly present  Cardiovascular: Normal rate, regular rhythm and normal heart sounds  Exam reveals no gallop and no friction rub  No murmur heard  Pulmonary/Chest: Effort normal and breath sounds normal  No respiratory distress  She has no wheezes  Abdominal: Soft  Bowel sounds are normal  She exhibits no distension  There is no tenderness  Musculoskeletal: Normal range of motion  She exhibits no edema, tenderness or deformity  Lymphadenopathy:        Head (right side): No occipital adenopathy present  Head (left side): No occipital adenopathy present  Right: No supraclavicular adenopathy present  Left: No supraclavicular adenopathy present  Neurological: She is alert and oriented to person, place, and time  No cranial nerve deficit  Skin: Skin is warm and intact  No rash noted  She is not diaphoretic  No erythema  Psychiatric: She has a normal mood and affect  Her behavior is normal    Vitals reviewed  The history was obtained from the review of the chart, patient  Lab Results:   Lab Results   Component Value Date/Time    TSH 3RD Omie Cera 3 570 02/28/2020 03:18 PM    TSH 3RD GENERATON 1 520 08/27/2019 11:16 AM    Free T4 0 81 02/28/2020 03:18 PM    Free T4 0 82 08/27/2019 11:16 AM       Portions of the record may have been created with voice recognition software  Occasional wrong word or "sound a like" substitutions may have occurred due to the inherent limitations of voice recognition software  Read the chart carefully and recognize, using context, where substitutions have occurred

## 2020-06-02 ENCOUNTER — TELEPHONE (OUTPATIENT)
Dept: OBGYN CLINIC | Facility: CLINIC | Age: 60
End: 2020-06-02

## 2020-06-02 ENCOUNTER — TRANSCRIBE ORDERS (OUTPATIENT)
Dept: LAB | Facility: CLINIC | Age: 60
End: 2020-06-02

## 2020-06-02 ENCOUNTER — APPOINTMENT (OUTPATIENT)
Dept: LAB | Facility: CLINIC | Age: 60
End: 2020-06-02
Payer: COMMERCIAL

## 2020-06-02 DIAGNOSIS — N30.00 ACUTE CYSTITIS WITHOUT HEMATURIA: Primary | ICD-10-CM

## 2020-06-02 DIAGNOSIS — R39.9 UTI SYMPTOMS: ICD-10-CM

## 2020-06-02 DIAGNOSIS — R39.9 UTI SYMPTOMS: Primary | ICD-10-CM

## 2020-06-02 LAB
BACTERIA UR QL AUTO: ABNORMAL /HPF
BILIRUB UR QL STRIP: NEGATIVE
CLARITY UR: CLEAR
COLOR UR: YELLOW
GLUCOSE UR STRIP-MCNC: NEGATIVE MG/DL
HGB UR QL STRIP.AUTO: NEGATIVE
KETONES UR STRIP-MCNC: NEGATIVE MG/DL
LEUKOCYTE ESTERASE UR QL STRIP: ABNORMAL
NITRITE UR QL STRIP: POSITIVE
NON-SQ EPI CELLS URNS QL MICRO: ABNORMAL /HPF
PH UR STRIP.AUTO: 6 [PH]
PROT UR STRIP-MCNC: NEGATIVE MG/DL
RBC #/AREA URNS AUTO: ABNORMAL /HPF
SP GR UR STRIP.AUTO: <=1.005 (ref 1–1.03)
UROBILINOGEN UR QL STRIP.AUTO: 0.2 E.U./DL
WBC #/AREA URNS AUTO: ABNORMAL /HPF

## 2020-06-02 PROCEDURE — 87147 CULTURE TYPE IMMUNOLOGIC: CPT

## 2020-06-02 PROCEDURE — 87077 CULTURE AEROBIC IDENTIFY: CPT

## 2020-06-02 PROCEDURE — 87186 SC STD MICRODIL/AGAR DIL: CPT

## 2020-06-02 PROCEDURE — 81001 URINALYSIS AUTO W/SCOPE: CPT

## 2020-06-02 PROCEDURE — 87086 URINE CULTURE/COLONY COUNT: CPT

## 2020-06-02 RX ORDER — NITROFURANTOIN 25; 75 MG/1; MG/1
100 CAPSULE ORAL 2 TIMES DAILY
Qty: 14 CAPSULE | Refills: 0 | Status: SHIPPED | OUTPATIENT
Start: 2020-06-02 | End: 2020-06-09

## 2020-06-04 LAB
BACTERIA UR CULT: ABNORMAL
BACTERIA UR CULT: ABNORMAL

## 2020-06-05 ENCOUNTER — TELEPHONE (OUTPATIENT)
Dept: OBGYN CLINIC | Facility: CLINIC | Age: 60
End: 2020-06-05

## 2020-06-05 DIAGNOSIS — N39.0 URINARY TRACT INFECTION WITHOUT HEMATURIA, SITE UNSPECIFIED: Primary | ICD-10-CM

## 2020-06-11 ENCOUNTER — APPOINTMENT (OUTPATIENT)
Dept: LAB | Facility: MEDICAL CENTER | Age: 60
End: 2020-06-11
Payer: COMMERCIAL

## 2020-06-11 DIAGNOSIS — N39.0 URINARY TRACT INFECTION WITHOUT HEMATURIA, SITE UNSPECIFIED: ICD-10-CM

## 2020-06-11 LAB
BACTERIA UR QL AUTO: ABNORMAL /HPF
BILIRUB UR QL STRIP: NEGATIVE
CLARITY UR: CLEAR
COLOR UR: YELLOW
GLUCOSE UR STRIP-MCNC: NEGATIVE MG/DL
HGB UR QL STRIP.AUTO: NEGATIVE
HYALINE CASTS #/AREA URNS LPF: ABNORMAL /LPF
KETONES UR STRIP-MCNC: ABNORMAL MG/DL
LEUKOCYTE ESTERASE UR QL STRIP: NEGATIVE
NITRITE UR QL STRIP: NEGATIVE
NON-SQ EPI CELLS URNS QL MICRO: ABNORMAL /HPF
PH UR STRIP.AUTO: 7 [PH]
PROT UR STRIP-MCNC: NEGATIVE MG/DL
RBC #/AREA URNS AUTO: ABNORMAL /HPF
SP GR UR STRIP.AUTO: 1.01 (ref 1–1.03)
UROBILINOGEN UR QL STRIP.AUTO: 0.2 E.U./DL
WBC #/AREA URNS AUTO: ABNORMAL /HPF

## 2020-06-11 PROCEDURE — 87086 URINE CULTURE/COLONY COUNT: CPT

## 2020-06-11 PROCEDURE — 81001 URINALYSIS AUTO W/SCOPE: CPT

## 2020-06-12 LAB — BACTERIA UR CULT: NORMAL

## 2020-07-22 ENCOUNTER — LAB (OUTPATIENT)
Dept: LAB | Facility: HOSPITAL | Age: 60
End: 2020-07-22
Attending: INTERNAL MEDICINE
Payer: COMMERCIAL

## 2020-07-22 DIAGNOSIS — E03.9 ACQUIRED HYPOTHYROIDISM: ICD-10-CM

## 2020-07-22 DIAGNOSIS — E03.9 ACQUIRED HYPOTHYROIDISM: Primary | ICD-10-CM

## 2020-07-22 LAB
T4 FREE SERPL-MCNC: 1.11 NG/DL (ref 0.76–1.46)
TSH SERPL DL<=0.05 MIU/L-ACNC: 0.1 UIU/ML (ref 0.36–3.74)

## 2020-07-22 PROCEDURE — 36415 COLL VENOUS BLD VENIPUNCTURE: CPT

## 2020-07-22 PROCEDURE — 84443 ASSAY THYROID STIM HORMONE: CPT

## 2020-07-22 PROCEDURE — 84439 ASSAY OF FREE THYROXINE: CPT

## 2020-07-23 ENCOUNTER — TELEPHONE (OUTPATIENT)
Dept: ENDOCRINOLOGY | Facility: CLINIC | Age: 60
End: 2020-07-23

## 2020-07-23 NOTE — TELEPHONE ENCOUNTER
Notified pt re: labs,   She stated her heart has been racing x 4 days, heavy on chest, has lost a lot of weight  Do you think she needs a cardio and who do you recommend?

## 2020-07-23 NOTE — RESULT ENCOUNTER NOTE
TSH is a little low but the free T4 is normal   Continue current thyroid hormone supplementation      Sent to my chart

## 2020-07-24 NOTE — TELEPHONE ENCOUNTER
Left message for pt stating that Dr Chase Tang feels she needs to contact her PCP for the sx she called us about    Wt loss, heart racing, heaviness on her chest

## 2020-08-17 ENCOUNTER — NURSE TRIAGE (OUTPATIENT)
Dept: OTHER | Facility: OTHER | Age: 60
End: 2020-08-17

## 2020-08-17 NOTE — TELEPHONE ENCOUNTER
Regarding: COVID-19 coughing SOB  ----- Message from Niles Contreras sent at 8/17/2020 11:57 AM EDT -----  "I need to have a COVID-19 test I am experiencing a lot of coughing, and shortness of breath "

## 2020-08-17 NOTE — TELEPHONE ENCOUNTER
Second attempt to reach patient  Left VM stating to call back for care advice if needed  Closing chart at this time due to no contact

## 2020-09-10 ENCOUNTER — HOSPITAL ENCOUNTER (OUTPATIENT)
Dept: MAMMOGRAPHY | Facility: MEDICAL CENTER | Age: 60
Discharge: HOME | End: 2020-09-10
Payer: COMMERCIAL

## 2020-09-10 VITALS — WEIGHT: 126 LBS | BODY MASS INDEX: 20.99 KG/M2 | HEIGHT: 65 IN

## 2020-09-10 DIAGNOSIS — Z12.31 ENCOUNTER FOR SCREENING MAMMOGRAM FOR MALIGNANT NEOPLASM OF BREAST: ICD-10-CM

## 2020-09-10 PROCEDURE — 77067 SCR MAMMO BI INCL CAD: CPT

## 2020-09-10 PROCEDURE — 77063 BREAST TOMOSYNTHESIS BI: CPT

## 2020-09-15 ENCOUNTER — ANNUAL EXAM (OUTPATIENT)
Dept: OBGYN CLINIC | Facility: CLINIC | Age: 60
End: 2020-09-15

## 2020-09-15 VITALS
BODY MASS INDEX: 19.13 KG/M2 | WEIGHT: 114.8 LBS | DIASTOLIC BLOOD PRESSURE: 64 MMHG | SYSTOLIC BLOOD PRESSURE: 124 MMHG | TEMPERATURE: 97.6 F | HEIGHT: 65 IN

## 2020-09-15 DIAGNOSIS — N95.2 ATROPHIC VAGINITIS: ICD-10-CM

## 2020-09-15 DIAGNOSIS — Z12.31 ENCOUNTER FOR SCREENING MAMMOGRAM FOR MALIGNANT NEOPLASM OF BREAST: ICD-10-CM

## 2020-09-15 DIAGNOSIS — Z01.419 ENCOUNTER FOR GYNECOLOGICAL EXAMINATION (GENERAL) (ROUTINE) WITHOUT ABNORMAL FINDINGS: Primary | ICD-10-CM

## 2020-09-15 DIAGNOSIS — N30.20 CHRONIC CYSTITIS: ICD-10-CM

## 2020-09-15 PROBLEM — Z98.890 STATUS POST CRYOABLATION: Status: ACTIVE | Noted: 2017-02-16

## 2020-09-15 PROBLEM — Z90.710 STATUS POST HYSTERECTOMY: Status: ACTIVE | Noted: 2017-02-16

## 2020-09-15 PROBLEM — I10 HYPERTENSION: Status: ACTIVE | Noted: 2019-01-04

## 2020-09-15 PROBLEM — C64.1 CANCER OF KIDNEY, RIGHT (HCC): Status: ACTIVE | Noted: 2017-09-26

## 2020-09-15 PROCEDURE — S0612 ANNUAL GYNECOLOGICAL EXAMINA: HCPCS | Performed by: OBSTETRICS & GYNECOLOGY

## 2020-09-15 RX ORDER — ESTRADIOL 0.1 MG/G
1 CREAM VAGINAL DAILY
Qty: 42.5 G | Refills: 3 | Status: SHIPPED | OUTPATIENT
Start: 2020-09-15 | End: 2020-11-13

## 2020-09-15 NOTE — PROGRESS NOTES
Assessment/Plan:     Diagnoses and all orders for this visit:    Encounter for gynecological examination (general) (routine) without abnormal findings    Encounter for screening mammogram for malignant neoplasm of breast  -     Mammo screening bilateral w 3d & cad; Future    Chronic cystitis  -     estradiol (ESTRACE) 0 1 mg/g vaginal cream; Insert 1 g into the vagina daily Use nightly x 14 days then twice weekly    Atrophic vaginitis  -     estradiol (ESTRACE) 0 1 mg/g vaginal cream; Insert 1 g into the vagina daily Use nightly x 14 days then twice weekly             Reyes Garcia is a 62 y o  female who presents for annual exam  The patient has no complaints today  The patient is sexually active - having more pain with IC but no bleeding  GYN screening history: last pap: was normal and last mammogram: was normal  The patient has never taken hormone replacement therapy  Patient denies post-menopausal vaginal bleeding  The patient participates in regular exercise: yes  The patient reports some mild leakage  Menstrual History:  OB History        3    Para   3    Term   3            AB        Living           SAB        TAB        Ectopic        Multiple        Live Births                    No LMP recorded  Patient has had a hysterectomy  Past Medical History:   Diagnosis Date    Anxiety     Basal cell carcinoma (BCC)     shoulder    Disease of thyroid gland     Skin cancer of chest, excluding breast        Current Outpatient Medications on File Prior to Visit   Medication Sig    Cholecalciferol 2000 units CAPS Take 1 capsule by mouth    escitalopram (LEXAPRO) 20 mg tablet TK 1 T PO QD    gabapentin (NEURONTIN) 300 mg capsule     ibuprofen (MOTRIN) 600 mg tablet     LORazepam (ATIVAN) 1 mg tablet Take 1 mg by mouth every 6 (six) hours as needed for anxiety      losartan (COZAAR) 25 mg tablet TK 1 T PO QD    TIROSINT 100 MCG CAPS Take 1 capsule (100 mcg total) by mouth daily    traZODone (DESYREL) 100 mg tablet     clonazePAM (KlonoPIN) 0 5 mg tablet TK 1 T PO BID    FLUARIX QUADRIVALENT 0 5 ML ALEX inject 0 5 milliliter intramuscularly    Magnesium 100 MG TABS Take by mouth    MULTIPLE VITAMIN PO Take by mouth    Omega-3 Fatty Acids (EQL OMEGA 3 FISH OIL) 1200 MG CAPS Take by mouth    triamcinolone (KENALOG) 0 1 % cream     valACYclovir (VALTREX) 1,000 mg tablet take 1 tablet by mouth BEFORE PROCEDURE, ONE TAB THE DAY OF PROCE     (REFER TO PRESCRIPTION NOTES)   zolpidem (AMBIEN) 10 mg tablet Take 10 mg by mouth daily    [DISCONTINUED] estradiol (ESTRACE) 0 1 mg/g vaginal cream Insert 1 g into the vagina every other day Do not use applicator  Use 1 finger tip dab to apply the urethra every other day  (Patient not taking: Reported on 1/10/2019 )     No current facility-administered medications on file prior to visit  The following portions of the patient's history were reviewed and updated as appropriate: allergies, current medications, past family history, past medical history, past social history, past surgical history and problem list     Review of Systems  Pertinent items are noted in HPI  Objective      /64 (BP Location: Right arm, Patient Position: Sitting, Cuff Size: Standard)   Temp 97 6 °F (36 4 °C) (Tympanic)   Ht 5' 4 5" (1 638 m)   Wt 52 1 kg (114 lb 12 8 oz)   BMI 19 40 kg/m²     General:   alert and oriented, in no acute distress   Heart:    Breasts: regular rate and rhythm, S1, S2 normal, no murmur, click, rub or gallop   appear normal, no suspicious masses, no skin or nipple changes or axillary nodes  Lungs: clear to auscultation bilaterally   Abdomen: soft, non-tender, without masses or organomegaly   Vulva: normal   Vagina:  Atrophic especially at apex   Cervix: surgically absent   Uterus: surgically absent   Adnexa: surgically absent

## 2020-09-22 ENCOUNTER — TELEPHONE (OUTPATIENT)
Dept: GASTROENTEROLOGY | Facility: AMBULARY SURGERY CENTER | Age: 60
End: 2020-09-22

## 2020-09-22 DIAGNOSIS — Z12.11 SCREENING FOR COLON CANCER: Primary | ICD-10-CM

## 2020-09-22 DIAGNOSIS — Z86.010 HISTORY OF COLON POLYPS: ICD-10-CM

## 2020-09-22 NOTE — TELEPHONE ENCOUNTER
09/22/20  Screened by: Tanvir John    Referring Provider Dr Reena Shoemaker: If patient answers NO to medical questions, then schedule procedure  If patient answers YES to medical questions, then schedule office appointment  Previous Colonoscopy yes  Date and Facility of last colonoscopy? [facility] on 6 years ago    Comments: patient passed OA and would like to be scheduled with Dr Ildefonso Najera  Patient can be reached at [phone]        Pre- Screening: There is no height or weight on file to calculate BMI  Has patient been referred for a routine screening Colonoscopy? yes  Is the patient between 39-70 years old? yes    SCHEDULING STAFF   If the patient is between 45yrs-49yrs, please advise patient to confirm benefits/coverage with their insurance company for a routine screening colonoscopy, some insurance carriers will only cover at [id number] or older   If the patient is over 76years old, please schedule an office visit   If the patient had a previous colonoscopy send to the procedure  before continuing    Have you been diagnosed with a bleeding disorder or anemia? no    Do you take no    Have you been diagnosed with Diabetes or are you taking any   Diabetic medications? no    Do you have any of the following symptoms?  no    Have you had a coronary or vascular stent within the last year? no    Have you had a heart attack or stroke in the last 6 months? no    Have you had intestinal surgery in the last 3 months? no    Do you have problems with: no    Do you use: no    Have you been hospitalized in the last Month? no    Have you had chest pain (angina) or breathing problems  (COPD) in the last 3 months? no    Do you have any difficulty walking up a flight of stairs? no    Have you had Kidney failure or insufficiency? No    Have you had heart valve surgery? no    Are you confined to a wheelchair?  no

## 2020-09-30 ENCOUNTER — PREP FOR PROCEDURE (OUTPATIENT)
Dept: GASTROENTEROLOGY | Facility: AMBULARY SURGERY CENTER | Age: 60
End: 2020-09-30

## 2020-09-30 DIAGNOSIS — Z86.010 HISTORY OF COLON POLYPS: Primary | ICD-10-CM

## 2020-09-30 DIAGNOSIS — Z12.11 SCREENING FOR COLON CANCER: ICD-10-CM

## 2020-09-30 RX ORDER — SODIUM, POTASSIUM,MAG SULFATES 17.5-3.13G
SOLUTION, RECONSTITUTED, ORAL ORAL
Qty: 2 BOTTLE | Refills: 0 | Status: SHIPPED | OUTPATIENT
Start: 2020-09-30 | End: 2020-11-10

## 2020-09-30 NOTE — TELEPHONE ENCOUNTER
Returned pt's , last colonoscopy was approximately 8 years ago, normal, but previously did have polyps and recommended to repeat every 5 years  Pt requested to schedule with Dr Natalie Carey  Pt is [facility]  with Dr Natalie Carey  Reviewed prep instructions, reminded needs a  and will get a call the day before with the arrival time  Mailed prep instructions to pt  Provider:  Please send Suprep to pt's [facility] pharmacy  Thank you!

## 2020-10-27 ENCOUNTER — ANESTHESIA EVENT (OUTPATIENT)
Dept: GASTROENTEROLOGY | Facility: AMBULARY SURGERY CENTER | Age: 60
End: 2020-10-27

## 2020-11-10 ENCOUNTER — HOSPITAL ENCOUNTER (OUTPATIENT)
Dept: GASTROENTEROLOGY | Facility: AMBULARY SURGERY CENTER | Age: 60
Setting detail: OUTPATIENT SURGERY
Discharge: HOME | End: 2020-11-10
Attending: INTERNAL MEDICINE | Admitting: INTERNAL MEDICINE
Payer: COMMERCIAL

## 2020-11-10 ENCOUNTER — ANESTHESIA (OUTPATIENT)
Dept: GASTROENTEROLOGY | Facility: AMBULARY SURGERY CENTER | Age: 60
End: 2020-11-10

## 2020-11-10 VITALS
HEART RATE: 62 BPM | HEIGHT: 65 IN | BODY MASS INDEX: 18.33 KG/M2 | OXYGEN SATURATION: 100 % | TEMPERATURE: 97 F | SYSTOLIC BLOOD PRESSURE: 139 MMHG | DIASTOLIC BLOOD PRESSURE: 78 MMHG | WEIGHT: 110 LBS | RESPIRATION RATE: 20 BRPM

## 2020-11-10 VITALS — HEART RATE: 65 BPM

## 2020-11-10 DIAGNOSIS — Z86.010 HISTORY OF COLON POLYPS: ICD-10-CM

## 2020-11-10 DIAGNOSIS — Z12.11 SCREENING FOR COLON CANCER: ICD-10-CM

## 2020-11-10 PROCEDURE — 88305 TISSUE EXAM BY PATHOLOGIST: CPT | Performed by: PATHOLOGY

## 2020-11-10 PROCEDURE — 45380 COLONOSCOPY AND BIOPSY: CPT | Performed by: INTERNAL MEDICINE

## 2020-11-10 RX ORDER — PROPOFOL 10 MG/ML
INJECTION, EMULSION INTRAVENOUS AS NEEDED
Status: DISCONTINUED | OUTPATIENT
Start: 2020-11-10 | End: 2020-11-10

## 2020-11-10 RX ORDER — LIDOCAINE HYDROCHLORIDE 10 MG/ML
INJECTION, SOLUTION EPIDURAL; INFILTRATION; INTRACAUDAL; PERINEURAL AS NEEDED
Status: DISCONTINUED | OUTPATIENT
Start: 2020-11-10 | End: 2020-11-10

## 2020-11-10 RX ORDER — LIDOCAINE HYDROCHLORIDE 10 MG/ML
0.5 INJECTION, SOLUTION EPIDURAL; INFILTRATION; INTRACAUDAL; PERINEURAL ONCE AS NEEDED
Status: DISCONTINUED | OUTPATIENT
Start: 2020-11-10 | End: 2020-11-14 | Stop reason: HOSPADM

## 2020-11-10 RX ORDER — SODIUM CHLORIDE, SODIUM LACTATE, POTASSIUM CHLORIDE, CALCIUM CHLORIDE 600; 310; 30; 20 MG/100ML; MG/100ML; MG/100ML; MG/100ML
125 INJECTION, SOLUTION INTRAVENOUS CONTINUOUS
Status: DISCONTINUED | OUTPATIENT
Start: 2020-11-10 | End: 2020-11-14 | Stop reason: HOSPADM

## 2020-11-10 RX ORDER — PROPOFOL 10 MG/ML
INJECTION, EMULSION INTRAVENOUS CONTINUOUS PRN
Status: DISCONTINUED | OUTPATIENT
Start: 2020-11-10 | End: 2020-11-10

## 2020-11-10 RX ADMIN — SODIUM CHLORIDE, SODIUM LACTATE, POTASSIUM CHLORIDE, AND CALCIUM CHLORIDE 125 ML/HR: .6; .31; .03; .02 INJECTION, SOLUTION INTRAVENOUS at 08:45

## 2020-11-10 RX ADMIN — PROPOFOL 50 MG: 10 INJECTION, EMULSION INTRAVENOUS at 09:18

## 2020-11-10 RX ADMIN — PROPOFOL 140 MCG/KG/MIN: 10 INJECTION, EMULSION INTRAVENOUS at 09:18

## 2020-11-10 RX ADMIN — LIDOCAINE HYDROCHLORIDE 50 MG: 10 INJECTION, SOLUTION EPIDURAL; INFILTRATION; INTRACAUDAL at 09:15

## 2020-11-10 RX ADMIN — SODIUM CHLORIDE, SODIUM LACTATE, POTASSIUM CHLORIDE, AND CALCIUM CHLORIDE: .6; .31; .03; .02 INJECTION, SOLUTION INTRAVENOUS at 09:11

## 2020-11-10 RX ADMIN — PROPOFOL 100 MG: 10 INJECTION, EMULSION INTRAVENOUS at 09:15

## 2020-11-10 RX ADMIN — PROPOFOL 50 MG: 10 INJECTION, EMULSION INTRAVENOUS at 09:16

## 2020-11-11 DIAGNOSIS — N95.2 ATROPHIC VAGINITIS: ICD-10-CM

## 2020-11-11 DIAGNOSIS — N30.20 CHRONIC CYSTITIS: ICD-10-CM

## 2020-11-12 ENCOUNTER — TELEPHONE (OUTPATIENT)
Dept: GASTROENTEROLOGY | Facility: CLINIC | Age: 60
End: 2020-11-12

## 2020-11-13 RX ORDER — ESTRADIOL 0.1 MG/G
1 CREAM VAGINAL DAILY
Qty: 126 G | Refills: 2 | Status: SHIPPED | OUTPATIENT
Start: 2020-11-13 | End: 2021-07-16 | Stop reason: SDUPTHER

## 2020-12-02 NOTE — TELEPHONE ENCOUNTER
CT abdomen pelvis with and without contrast ordered to be completed prior to office visit with Dr Fredrick Cash

## 2020-12-02 NOTE — TELEPHONE ENCOUNTER
Dr Estrada patient is coming in to see you for follow up  Would you like any updated imaging done for this patient prior to visit  Her last imaging was last year

## 2020-12-03 ENCOUNTER — TELEPHONE (OUTPATIENT)
Dept: UROLOGY | Facility: MEDICAL CENTER | Age: 60
End: 2020-12-03

## 2020-12-03 NOTE — TELEPHONE ENCOUNTER
Call placed to patient and made her aware of imaging that needed to be done prior to her visit  She is requesting help with scheduling  I will send to clerical Yris Tan and she will be calling patient to schedule this

## 2020-12-09 ENCOUNTER — HOSPITAL ENCOUNTER (OUTPATIENT)
Dept: CT IMAGING | Facility: HOSPITAL | Age: 60
Discharge: HOME | End: 2020-12-09
Payer: COMMERCIAL

## 2020-12-09 DIAGNOSIS — C64.1 RENAL CELL CARCINOMA OF RIGHT KIDNEY (HCC): ICD-10-CM

## 2020-12-09 PROCEDURE — 74178 CT ABD&PLV WO CNTR FLWD CNTR: CPT

## 2020-12-09 PROCEDURE — G1004 CDSM NDSC: HCPCS

## 2020-12-09 RX ADMIN — IOHEXOL 100 ML: 350 INJECTION, SOLUTION INTRAVENOUS at 18:57

## 2020-12-10 ENCOUNTER — TELEPHONE (OUTPATIENT)
Dept: UROLOGY | Facility: MEDICAL CENTER | Age: 60
End: 2020-12-10

## 2020-12-10 ENCOUNTER — OFFICE VISIT (OUTPATIENT)
Dept: UROLOGY | Facility: MEDICAL CENTER | Age: 60
End: 2020-12-10
Payer: COMMERCIAL

## 2020-12-10 VITALS
DIASTOLIC BLOOD PRESSURE: 80 MMHG | HEART RATE: 72 BPM | WEIGHT: 120 LBS | BODY MASS INDEX: 19.99 KG/M2 | HEIGHT: 65 IN | SYSTOLIC BLOOD PRESSURE: 130 MMHG

## 2020-12-10 DIAGNOSIS — C64.1 RENAL CELL CARCINOMA OF RIGHT KIDNEY (HCC): Primary | ICD-10-CM

## 2020-12-10 PROCEDURE — 99214 OFFICE O/P EST MOD 30 MIN: CPT | Performed by: UROLOGY

## 2021-03-10 DIAGNOSIS — Z23 ENCOUNTER FOR IMMUNIZATION: ICD-10-CM

## 2021-06-07 ENCOUNTER — TELEPHONE (OUTPATIENT)
Dept: PSYCHIATRY | Facility: CLINIC | Age: 61
End: 2021-06-07

## 2021-06-07 NOTE — TELEPHONE ENCOUNTER
Pt called looking to set up w/ a psychiatrist  I informed pt  call RILEY Cleary to put in a referral in the sytem  Once in I will forward information to Intake   I told her of the wait time for an appt and she said she was going to try somewhere else

## 2021-07-16 ENCOUNTER — TELEPHONE (OUTPATIENT)
Dept: OBGYN CLINIC | Facility: CLINIC | Age: 61
End: 2021-07-16

## 2021-07-16 DIAGNOSIS — N95.2 ATROPHIC VAGINITIS: ICD-10-CM

## 2021-07-16 DIAGNOSIS — N30.20 CHRONIC CYSTITIS: ICD-10-CM

## 2021-07-16 DIAGNOSIS — Z12.31 VISIT FOR SCREENING MAMMOGRAM: Primary | ICD-10-CM

## 2021-07-16 RX ORDER — ESTRADIOL 0.1 MG/G
1 CREAM VAGINAL 2 TIMES WEEKLY
Qty: 42.5 G | Refills: 2 | Status: SHIPPED | OUTPATIENT
Start: 2021-07-19 | End: 2021-09-30

## 2021-07-16 NOTE — TELEPHONE ENCOUNTER
Pt requesting estradial cream to be sent in as well as a mammogram as she would like to schedule her mammo she has a yearly exam scheduled with Miranda Patel on 9/16

## 2021-07-19 ENCOUNTER — TELEPHONE (OUTPATIENT)
Dept: OBGYN CLINIC | Facility: CLINIC | Age: 61
End: 2021-07-19

## 2021-07-19 NOTE — TELEPHONE ENCOUNTER
I don't see any urinalysis in either system  Please have her go to the lab for a urinalysis, urine culture with sensitivities  Once she has done this, please confirm allergies and then Rx Bactrim DS 1 tablet PO BID x 7 days

## 2021-07-19 NOTE — TELEPHONE ENCOUNTER
Pt was seen at a [facility] on 7/16 and is on Nitrofuranto-Mono-mcr 100 mg 2 xday; it was better for a couple days but now it is back  She had a urinalysis done but has not heard from them  In 2019 she had a uti and went through 3 antibiotics  [facility]

## 2021-07-19 NOTE — TELEPHONE ENCOUNTER
Pt contacted and advised as directed  Pt stated she is still taking the macrobid she started on 07/16/21  Pt informed to finish abx and if still having ongoing sx to call back and then we will order u/a and culture with sensitivities and once this is done we will send bactrim ds 1 tablet po bid x 7 days if she is not allergic to sulfa  Pt agreed to plan of action  She will call back if still having ongoing sx after she finishes her abx of Macrobid she is still taking

## 2021-09-30 ENCOUNTER — TELEPHONE (OUTPATIENT)
Dept: OBGYN CLINIC | Facility: CLINIC | Age: 61
End: 2021-09-30

## 2021-09-30 ENCOUNTER — ANNUAL EXAM (OUTPATIENT)
Dept: OBGYN CLINIC | Facility: CLINIC | Age: 61
End: 2021-09-30
Payer: COMMERCIAL

## 2021-09-30 VITALS
BODY MASS INDEX: 19.96 KG/M2 | HEIGHT: 65 IN | SYSTOLIC BLOOD PRESSURE: 118 MMHG | DIASTOLIC BLOOD PRESSURE: 64 MMHG | WEIGHT: 119.8 LBS

## 2021-09-30 DIAGNOSIS — C64.1 CANCER OF KIDNEY, RIGHT (HCC): Primary | ICD-10-CM

## 2021-09-30 DIAGNOSIS — Z01.419 ENCOUNTER FOR ANNUAL ROUTINE GYNECOLOGICAL EXAMINATION: Primary | ICD-10-CM

## 2021-09-30 DIAGNOSIS — Z80.41 FH: OVARIAN CANCER: ICD-10-CM

## 2021-09-30 DIAGNOSIS — N95.2 ATROPHIC VAGINITIS: ICD-10-CM

## 2021-09-30 DIAGNOSIS — R10.2 PELVIC PAIN: ICD-10-CM

## 2021-09-30 PROCEDURE — S0612 ANNUAL GYNECOLOGICAL EXAMINA: HCPCS | Performed by: OBSTETRICS & GYNECOLOGY

## 2021-09-30 RX ORDER — DULOXETIN HYDROCHLORIDE 60 MG/1
120 CAPSULE, DELAYED RELEASE ORAL DAILY
COMMUNITY
Start: 2021-08-31

## 2021-09-30 RX ORDER — ESTRADIOL 10 UG/1
INSERT VAGINAL
Qty: 24 TABLET | Refills: 3 | Status: SHIPPED | OUTPATIENT
Start: 2021-09-30 | End: 2021-11-13

## 2021-09-30 NOTE — TELEPHONE ENCOUNTER
----- Message from Bruno Freeman MD sent at 9/30/2021  2:37 PM EDT -----  Regarding: BRCA testing  Can you call insurance and see if BRCA is covered? Dx:  personal h/o kidney cancer, family h/o ovarian CA?

## 2021-09-30 NOTE — PROGRESS NOTES
Assessment/Plan:      Encounter for annual routine gynecological examination    Pelvic pain  -     US pelvis complete w transvaginal; Future    Atrophic vaginitis  -     estradiol (Vagifem) 10 MCG TABS vaginal tablet; Insert 1 tablet (10 mcg total) into the vagina daily for 14 days, THEN 1 tablet (10 mcg total) 2 (two) times a week  Reyes Dacosta is a 62 y o  female who presents for annual exam  The patient has no complaints today  The patient is sexually active  She is still experiencing pain - Estrace just "falls out " The patient is not taking hormone replacement therapy  Patient denies post-menopausal vaginal bleeding  The patient denies any urinary complaints  She is experiencing some pelvic discomfort  Menstrual History:  OB History        3    Para   3    Term   3            AB        Living           SAB        TAB        Ectopic        Multiple        Live Births                    No LMP recorded  Patient has had a hysterectomy  Past Medical History:   Diagnosis Date    Anxiety     Basal cell carcinoma (BCC)     shoulder    Cancer ([facility] [address] )     Right    Chronic kidney disease     Colon polyp     Disease of thyroid gland     Skin cancer of chest, excluding breast        Family History   Problem Relation Age of Onset    Kidney disease Mother 28    Lung cancer Father 68    Anxiety disorder Sister     No Known Problems Daughter     No Known Problems Paternal Aunt     Anxiety disorder Sister     Anxiety disorder Sister     No Known Problems Daughter     Anxiety disorder Brother        The following portions of the patient's history were reviewed and updated as appropriate: allergies, current medications, past family history, past medical history, past social history, past surgical history and problem list     Review of Systems  Pertinent items are noted in HPI       Objective      /64 (BP Location: Right arm, Patient Position: Sitting, Cuff Size: Standard)   Ht 5' 4 5" (1 638 m)   Wt 54 3 kg (119 lb 12 8 oz)   BMI 20 25 kg/m²     General:   alert and oriented, in no acute distress   Heart:    Breasts:   regular rate and rhythm, S1, S2 normal, no murmur, click, rub or gallop   appear normal, no suspicious masses, no skin or nipple changes or axillary nodes     Lungs: clear to auscultation bilaterally   Abdomen: soft, non-tender, without masses or organomegaly   Vulva: normal   Vagina: normal mucosa   Cervix: anteverted   Uterus: surgically absent   Adnexa: normal adnexa

## 2021-10-05 ENCOUNTER — HOSPITAL ENCOUNTER (OUTPATIENT)
Dept: MAMMOGRAPHY | Facility: MEDICAL CENTER | Age: 61
Discharge: HOME | End: 2021-10-05
Payer: COMMERCIAL

## 2021-10-05 VITALS — BODY MASS INDEX: 19.13 KG/M2 | HEIGHT: 66 IN | WEIGHT: 119 LBS

## 2021-10-05 DIAGNOSIS — Z12.31 VISIT FOR SCREENING MAMMOGRAM: ICD-10-CM

## 2021-10-05 PROCEDURE — 77067 SCR MAMMO BI INCL CAD: CPT

## 2021-10-05 PROCEDURE — 77063 BREAST TOMOSYNTHESIS BI: CPT

## 2021-10-06 ENCOUNTER — HOSPITAL ENCOUNTER (OUTPATIENT)
Dept: ULTRASOUND IMAGING | Facility: HOSPITAL | Age: 61
Discharge: HOME | End: 2021-10-06
Attending: OBSTETRICS & GYNECOLOGY
Payer: COMMERCIAL

## 2021-10-06 DIAGNOSIS — R10.2 PELVIC PAIN: ICD-10-CM

## 2021-10-06 PROCEDURE — 76856 US EXAM PELVIC COMPLETE: CPT

## 2021-10-06 PROCEDURE — 76830 TRANSVAGINAL US NON-OB: CPT

## 2021-10-28 NOTE — TELEPHONE ENCOUNTER
Called on cpt codes 86392 and 58404 its covered after deduct met, XPN#[id number] Patient Seen in: [facility] Emergency Department    History   No chief complaint on file. HPI    The patient returns to the ER complaining of ongoing fatigue and dizziness.   He states that he was seen several days ago at [facility]      Alcohol use:  Yes        Alcohol/week: 2.0 standard drinks        Types: 2 Glasses of wine per week        Comment: Rare wine or mixed drink      Drug use: No      ROS  Pertinent positives: Dizziness, fatigue  All other organ systems are revie Neurological:      Mental Status: He is alert and oriented to person, place, and time.    Psychiatric:         Mood and Affect: Mood normal.         Behavior: Behavior normal.         ED Course        Labs Reviewed   BASIC METABOLIC PANEL (8) - Abnormal; reviewed and interpreted all ED vitals.     Pulse Ox: 97%, Room air, Normal     Monitor Interpretation:   atrial fibrillation, with ventricular rate of 75    Differential Diagnosis/ Diagnostic Considerations: Hypotension, dehydration, atrial fibrillation

## 2021-12-10 ENCOUNTER — APPOINTMENT (OUTPATIENT)
Dept: RADIOLOGY | Facility: MEDICAL CENTER | Age: 61
End: 2021-12-10
Attending: UROLOGY
Payer: COMMERCIAL

## 2021-12-10 ENCOUNTER — APPOINTMENT (OUTPATIENT)
Dept: LAB | Facility: MEDICAL CENTER | Age: 61
End: 2021-12-10
Attending: UROLOGY
Payer: COMMERCIAL

## 2021-12-10 DIAGNOSIS — C64.1 RENAL CELL CARCINOMA OF RIGHT KIDNEY (HCC): ICD-10-CM

## 2021-12-10 LAB
ALBUMIN SERPL BCP-MCNC: 3.9 G/DL (ref 3.5–5)
ALP SERPL-CCNC: 53 U/L (ref 46–116)
ALT SERPL W P-5'-P-CCNC: 33 U/L (ref 12–78)
ANION GAP SERPL CALCULATED.3IONS-SCNC: 9 MMOL/L (ref 4–13)
AST SERPL W P-5'-P-CCNC: 18 U/L (ref 5–45)
BILIRUB SERPL-MCNC: 0.82 MG/DL (ref 0.2–1)
BUN SERPL-MCNC: 17 MG/DL (ref 5–25)
CALCIUM SERPL-MCNC: 9.8 MG/DL (ref 8.3–10.1)
CHLORIDE SERPL-SCNC: 108 MMOL/L (ref 100–108)
CO2 SERPL-SCNC: 23 MMOL/L (ref 21–32)
CREAT SERPL-MCNC: 0.83 MG/DL (ref 0.6–1.3)
GFR SERPL CREATININE-BSD FRML MDRD: 77 ML/MIN/1.73SQ M
GLUCOSE P FAST SERPL-MCNC: 112 MG/DL (ref 65–99)
POTASSIUM SERPL-SCNC: 4.1 MMOL/L (ref 3.5–5.3)
PROT SERPL-MCNC: 7.8 G/DL (ref 6.4–8.2)
SODIUM SERPL-SCNC: 140 MMOL/L (ref 136–145)

## 2021-12-10 PROCEDURE — 36415 COLL VENOUS BLD VENIPUNCTURE: CPT

## 2021-12-10 PROCEDURE — 80053 COMPREHEN METABOLIC PANEL: CPT

## 2021-12-10 PROCEDURE — 71046 X-RAY EXAM CHEST 2 VIEWS: CPT

## 2021-12-11 ENCOUNTER — HOSPITAL ENCOUNTER (OUTPATIENT)
Dept: MRI IMAGING | Facility: HOSPITAL | Age: 61
Discharge: HOME | End: 2021-12-11
Attending: UROLOGY
Payer: COMMERCIAL

## 2021-12-11 DIAGNOSIS — C64.1 RENAL CELL CARCINOMA OF RIGHT KIDNEY (HCC): ICD-10-CM

## 2021-12-11 PROCEDURE — G1004 CDSM NDSC: HCPCS

## 2021-12-11 PROCEDURE — A9585 GADOBUTROL INJECTION: HCPCS | Performed by: UROLOGY

## 2021-12-11 PROCEDURE — 74183 MRI ABD W/O CNTR FLWD CNTR: CPT

## 2021-12-11 RX ADMIN — GADOBUTROL 5 ML: 604.72 INJECTION INTRAVENOUS at 13:15

## 2021-12-28 ENCOUNTER — APPOINTMENT (OUTPATIENT)
Dept: LAB | Facility: MEDICAL CENTER | Age: 61
End: 2021-12-28
Payer: COMMERCIAL

## 2021-12-28 DIAGNOSIS — E03.1 CONGENITAL HYPOTHYROIDISM: ICD-10-CM

## 2021-12-28 DIAGNOSIS — I10 ESSENTIAL HYPERTENSION, MALIGNANT: ICD-10-CM

## 2021-12-28 DIAGNOSIS — E55.9 VITAMIN D DEFICIENCY: ICD-10-CM

## 2021-12-28 DIAGNOSIS — R73.01 IMPAIRED FASTING GLUCOSE: ICD-10-CM

## 2021-12-28 LAB
25(OH)D3 SERPL-MCNC: 35.4 NG/ML (ref 30–100)
ALBUMIN SERPL BCP-MCNC: 4 G/DL (ref 3.5–5)
ALP SERPL-CCNC: 46 U/L (ref 46–116)
ALT SERPL W P-5'-P-CCNC: 36 U/L (ref 12–78)
ANION GAP SERPL CALCULATED.3IONS-SCNC: 4 MMOL/L (ref 4–13)
AST SERPL W P-5'-P-CCNC: 28 U/L (ref 5–45)
BASOPHILS # BLD AUTO: 0.05 THOUSANDS/ΜL (ref 0–0.1)
BASOPHILS NFR BLD AUTO: 1 % (ref 0–1)
BILIRUB SERPL-MCNC: 0.51 MG/DL (ref 0.2–1)
BUN SERPL-MCNC: 9 MG/DL (ref 5–25)
CALCIUM SERPL-MCNC: 9.6 MG/DL (ref 8.3–10.1)
CHLORIDE SERPL-SCNC: 104 MMOL/L (ref 100–108)
CHOLEST SERPL-MCNC: 273 MG/DL
CO2 SERPL-SCNC: 28 MMOL/L (ref 21–32)
CREAT SERPL-MCNC: 0.77 MG/DL (ref 0.6–1.3)
EOSINOPHIL # BLD AUTO: 0.17 THOUSAND/ΜL (ref 0–0.61)
EOSINOPHIL NFR BLD AUTO: 3 % (ref 0–6)
ERYTHROCYTE [DISTWIDTH] IN BLOOD BY AUTOMATED COUNT: 12.7 % (ref 11.6–15.1)
GFR SERPL CREATININE-BSD FRML MDRD: 84 ML/MIN/1.73SQ M
GLUCOSE P FAST SERPL-MCNC: 87 MG/DL (ref 65–99)
HCT VFR BLD AUTO: 45.8 % (ref 34.8–46.1)
HDLC SERPL-MCNC: 115 MG/DL
HGB BLD-MCNC: 15.1 G/DL (ref 11.5–15.4)
IMM GRANULOCYTES # BLD AUTO: 0.03 THOUSAND/UL (ref 0–0.2)
IMM GRANULOCYTES NFR BLD AUTO: 1 % (ref 0–2)
LDLC SERPL CALC-MCNC: 140 MG/DL (ref 0–100)
LYMPHOCYTES # BLD AUTO: 1.37 THOUSANDS/ΜL (ref 0.6–4.47)
LYMPHOCYTES NFR BLD AUTO: 27 % (ref 14–44)
MCH RBC QN AUTO: 34.6 PG (ref 26.8–34.3)
MCHC RBC AUTO-ENTMCNC: 33 G/DL (ref 31.4–37.4)
MCV RBC AUTO: 105 FL (ref 82–98)
MONOCYTES # BLD AUTO: 1.08 THOUSAND/ΜL (ref 0.17–1.22)
MONOCYTES NFR BLD AUTO: 22 % (ref 4–12)
NEUTROPHILS # BLD AUTO: 2.33 THOUSANDS/ΜL (ref 1.85–7.62)
NEUTS SEG NFR BLD AUTO: 46 % (ref 43–75)
NONHDLC SERPL-MCNC: 158 MG/DL
NRBC BLD AUTO-RTO: 0 /100 WBCS
PLATELET # BLD AUTO: 292 THOUSANDS/UL (ref 149–390)
PMV BLD AUTO: 9.5 FL (ref 8.9–12.7)
POTASSIUM SERPL-SCNC: 4.8 MMOL/L (ref 3.5–5.3)
PROT SERPL-MCNC: 7.3 G/DL (ref 6.4–8.2)
RBC # BLD AUTO: 4.37 MILLION/UL (ref 3.81–5.12)
SODIUM SERPL-SCNC: 136 MMOL/L (ref 136–145)
T3FREE SERPL-MCNC: 2.59 PG/ML (ref 2.3–4.2)
T4 FREE SERPL-MCNC: 1.15 NG/DL (ref 0.76–1.46)
TRIGL SERPL-MCNC: 89 MG/DL
TSH SERPL DL<=0.05 MIU/L-ACNC: 0.44 UIU/ML (ref 0.36–3.74)
WBC # BLD AUTO: 5.03 THOUSAND/UL (ref 4.31–10.16)

## 2021-12-28 PROCEDURE — 85025 COMPLETE CBC W/AUTO DIFF WBC: CPT

## 2021-12-28 PROCEDURE — 84439 ASSAY OF FREE THYROXINE: CPT

## 2021-12-28 PROCEDURE — 36415 COLL VENOUS BLD VENIPUNCTURE: CPT

## 2021-12-28 PROCEDURE — 80061 LIPID PANEL: CPT

## 2021-12-28 PROCEDURE — 84443 ASSAY THYROID STIM HORMONE: CPT

## 2021-12-28 PROCEDURE — 82306 VITAMIN D 25 HYDROXY: CPT

## 2021-12-28 PROCEDURE — 84481 FREE ASSAY (FT-3): CPT

## 2021-12-28 PROCEDURE — 80053 COMPREHEN METABOLIC PANEL: CPT

## 2022-04-14 ENCOUNTER — OFFICE VISIT (OUTPATIENT)
Dept: URGENT CARE | Facility: CLINIC | Age: 62
End: 2022-04-14
Payer: COMMERCIAL

## 2022-04-14 VITALS — RESPIRATION RATE: 18 BRPM | OXYGEN SATURATION: 95 % | HEART RATE: 123 BPM | TEMPERATURE: 97.3 F

## 2022-04-14 DIAGNOSIS — J02.9 SORE THROAT: Primary | ICD-10-CM

## 2022-04-14 LAB — S PYO AG THROAT QL: NEGATIVE

## 2022-04-14 PROCEDURE — 87880 STREP A ASSAY W/OPTIC: CPT | Performed by: NURSE PRACTITIONER

## 2022-04-14 PROCEDURE — 87636 SARSCOV2 & INF A&B AMP PRB: CPT | Performed by: NURSE PRACTITIONER

## 2022-04-14 PROCEDURE — 99213 OFFICE O/P EST LOW 20 MIN: CPT | Performed by: NURSE PRACTITIONER

## 2022-04-14 PROCEDURE — 87070 CULTURE OTHR SPECIMN AEROBIC: CPT | Performed by: NURSE PRACTITIONER

## 2022-04-14 RX ORDER — UBIDECARENONE 75 MG
CAPSULE ORAL
COMMUNITY

## 2022-04-14 RX ORDER — ATORVASTATIN CALCIUM 10 MG/1
TABLET, FILM COATED ORAL
COMMUNITY
Start: 2022-01-25

## 2022-04-14 NOTE — PATIENT INSTRUCTIONS
Viral Syndrome   AMBULATORY CARE:   Viral syndrome  is a term used for symptoms of an infection caused by a virus  Viruses are spread easily from person to person through the air and on shared items  Signs and symptoms  may start slowly or suddenly and last hours to days  They can be mild to severe and can change over days or hours  You may have any of the following:  · Fever and chills    · A runny or stuffy nose    · Cough, sore throat, or hoarseness    · Headache, or pain and pressure around your eyes    · Muscle aches and joint pain    · Shortness of breath or wheezing    · Abdominal pain, cramps, and diarrhea    · Nausea, vomiting, or loss of appetite    Call your local emergency number (911 in the [address] [facility]) or have someone else call if:   · You have a seizure  · You cannot be woken  · You have chest pain or trouble breathing  Seek care immediately if:   · You have a stiff neck, a bad headache, and sensitivity to light  · You feel weak, dizzy, or confused  · You stop urinating or urinate a lot less than usual     · You cough up blood or thick yellow or green mucus  · You have severe abdominal pain or your abdomen is larger than usual     Call your doctor if:   · Your symptoms do not get better with treatment or get worse after 3 days  · You have a rash or ear pain  · You have burning when you urinate  · You have questions or concerns about your condition or care  Treatment for viral syndrome  may include medicines to manage your symptoms  Antibiotics are not given for a viral infection  You may  need any of the following:  · Acetaminophen  decreases pain and fever  It is available without a doctor's order  Ask how much to take and how often to take it  Follow directions  Read the labels of all other medicines you are using to see if they also contain acetaminophen, or ask your doctor or pharmacist  Acetaminophen can cause liver damage if not taken correctly   Do not use more than 4 grams (4,000 milligrams) total of acetaminophen in one day  · NSAIDs , such as ibuprofen, help decrease swelling, pain, and fever  NSAIDs can cause stomach bleeding or kidney problems in certain people  If you take blood thinner medicine, always ask your healthcare provider if NSAIDs are safe for you  Always read the medicine label and follow directions  · Cold medicine  helps decrease swelling, control a cough, and relieve chest or nasal congestion  · Saline nasal spray  helps decrease nasal congestion  Manage your symptoms:   · Drink liquids as directed to prevent dehydration  Ask how much liquid to drink each day and which liquids are best for you  Ask if you should drink an oral rehydration solution (ORS)  An ORS has the right amounts of water, salts, and sugar you need to replace body fluids  This may help prevent dehydration caused by vomiting or diarrhea  Do not drink liquids with caffeine  Liquids with caffeine can make dehydration worse  · Get plenty of rest to help your body heal   Take naps throughout the day  Ask your healthcare provider when you can return to work and your normal activities  · Use a cool mist humidifier to help you breathe easier  Ask your healthcare provider how to use a cool mist humidifier  · Eat honey or use cough drops for a sore throat  Cough drops are available without a doctor's order  Follow directions for taking cough drops  · Do not smoke or be close to anyone who is smoking  Nicotine and other chemicals in cigarettes and cigars can cause lung damage  Smoking can also delay healing  Ask your healthcare provider for information if you currently smoke and need help to quit  E-cigarettes or smokeless tobacco still contain nicotine  Talk to your healthcare provider before you use these products  Prevent the spread of germs:       · Wash your hands often  Wash your hands several times each day   Wash after you use the bathroom, change a child's diaper, and before you prepare or eat food  Use soap and water every time  Rub your soapy hands together, lacing your fingers  Wash the front and back of your hands, and in between your fingers  Use the fingers of one hand to scrub under the fingernails of the other hand  Wash for at least 20 seconds  Rinse with warm, running water for several seconds  Then dry your hands with a clean towel or paper towel  Use hand  that contains alcohol if soap and water are not available  Do not touch your eyes, nose, or mouth without washing your hands first          · Cover a sneeze or cough  Use a tissue that covers your mouth and nose  Throw the tissue away in a trash can right away  Use the bend of your arm if a tissue is not available  Wash your hands well with soap and water or use a hand   · Stay away from others while you are sick  Avoid crowds as much as possible  · Ask about vaccines you may need  Talk to your healthcare provider about your vaccine history  He or she will tell you which vaccines you need, and when to get them  ? Get the influenza (flu) vaccine as soon as recommended each year  The flu vaccine is available starting in September or October  Flu viruses change, so it is important to get a flu vaccine every year  ? Get the pneumonia vaccine if recommended  This vaccine is usually recommended every 5 years  Your provider will tell you when to get this vaccine, if needed  Follow up with your doctor as directed:  Write down your questions so you remember to ask them during your visits  © [employer] 2022 Information is for End User's use only and may not be sold, redistributed or otherwise used for commercial purposes  All illustrations and images included in CareNotes® are the copyrighted property of [employer]  or Lisa Dye  The above information is an  only  It is not intended as medical advice for individual conditions or treatments  Talk to your doctor, nurse or pharmacist before following any medical regimen to see if it is safe and effective for you

## 2022-04-14 NOTE — PROGRESS NOTES
[facility]        NAME: Ruddy Vazquez is a 65 y o  female  : 1960    MRN: [id number]  DATE: 2022  TIME: 7:37 PM    Assessment and Plan   Sore throat [J02 9]  1  Sore throat  Throat culture    POCT rapid strepA    Covid/Flu-Office Collect     Rapid strep negative  Will send throat culture   covid flu swab collected   F/u with pcp   Pt in agreement with plan   Has appt with ent next week for ear pain  Patient Instructions     Follow up with PCP in 3-5 days  Proceed to  ER if symptoms worsen  Chief Complaint     Chief Complaint   Patient presents with    Cold Like Symptoms     Sore throat, ear ache bilat and body aches         History of Present Illness   Ruddy Vazquez presents to the clinic c/o    Pt states was with her sister on wed -   Sister thought it was allergies - does have a h/o transplant so is very cautions  [address] maybe it was allergies but now throat is more sore and ears are bothering her more  Wants to be checked  Review of Systems   Review of Systems   All other systems reviewed and are negative          Current Medications     Long-Term Medications   Medication Sig Dispense Refill    atorvastatin (LIPITOR) 10 mg tablet TAKE 1 TABLET BY MOUTH EVERY DAY AT NIGHT      Cholecalciferol 2000 units CAPS Take 1 capsule by mouth      cyanocobalamin (VITAMIN B-12) 100 mcg tablet B12 Active      DULoxetine (CYMBALTA) 60 mg delayed release capsule Take 120 mg by mouth daily      gabapentin (NEURONTIN) 300 mg capsule       ibuprofen (MOTRIN) 600 mg tablet       losartan (COZAAR) 25 mg tablet TK 1 T PO QD  0    Magnesium 100 MG TABS Take by mouth      Omega-3 Fatty Acids (EQL OMEGA 3 FISH OIL) 1200 MG CAPS Take by mouth        TIROSINT 100 MCG CAPS Take 1 capsule (100 mcg total) by mouth daily 90 capsule 3    traZODone (DESYREL) 100 mg tablet       escitalopram (LEXAPRO) 20 mg tablet TK 1 T PO QD  1    estradiol (Vagifem) 10 MCG TABS vaginal tablet Insert 1 tablet (10 mcg total) into the vagina daily for 14 days, THEN 1 tablet (10 mcg total) 2 (two) times a week  24 tablet 3    triamcinolone (KENALOG) 0 1 % cream   (Patient not taking: Reported on 4/14/2022 )      valACYclovir (VALTREX) 1,000 mg tablet take 1 tablet by mouth BEFORE PROCEDURE, ONE TAB THE DAY OF PROCE     (REFER TO PRESCRIPTION NOTES)  (Patient not taking: Reported on 4/14/2022)  0    zolpidem (AMBIEN) 10 mg tablet Take 10 mg by mouth daily         Current Allergies     Allergies as of 04/14/2022    (No Known Allergies)            The following portions of the patient's history were reviewed and updated as appropriate: allergies, current medications, past family history, past medical history, past social history, past surgical history and problem list     Objective   Pulse (!) 123   Temp (!) 97 3 °F (36 3 °C) (Tympanic)   Resp 18   SpO2 95%        Physical Exam     Physical Exam  Vitals and nursing note reviewed  Constitutional:       Appearance: Normal appearance  She is well-developed  HENT:      Head: Normocephalic and atraumatic  Right Ear: Tympanic membrane, ear canal and external ear normal       Left Ear: Tympanic membrane, ear canal and external ear normal       Nose: Congestion present  Mouth/Throat:      Mouth: Mucous membranes are moist       Pharynx: Posterior oropharyngeal erythema present  No pharyngeal swelling  Tonsils: No tonsillar exudate  0 on the right  0 on the left  Comments: pnd  Eyes:      General: Lids are normal       Conjunctiva/sclera: Conjunctivae normal    Cardiovascular:      Rate and Rhythm: Normal rate and regular rhythm  Heart sounds: Normal heart sounds, S1 normal and S2 normal    Pulmonary:      Effort: Pulmonary effort is normal       Breath sounds: Normal breath sounds  Skin:     General: Skin is warm and dry     Neurological:      Mental Status: She is alert and oriented to person, place, and time    Psychiatric:         Speech: Speech normal          Behavior: Behavior normal  Behavior is cooperative  Thought Content:  Thought content normal          Judgment: Judgment normal

## 2022-04-15 LAB
FLUAV RNA RESP QL NAA+PROBE: NEGATIVE
FLUBV RNA RESP QL NAA+PROBE: NEGATIVE
SARS-COV-2 RNA RESP QL NAA+PROBE: NEGATIVE

## 2022-04-16 LAB — BACTERIA THROAT CULT: NORMAL

## 2022-08-30 ENCOUNTER — NURSE TRIAGE (OUTPATIENT)
Dept: OTHER | Facility: OTHER | Age: 62
End: 2022-08-30

## 2022-08-30 DIAGNOSIS — E27.40 ADRENAL INSUFFICIENCY (HCC): Primary | ICD-10-CM

## 2022-08-30 DIAGNOSIS — E03.9 ACQUIRED HYPOTHYROIDISM: Primary | ICD-10-CM

## 2022-08-30 DIAGNOSIS — E03.4 HYPOTHYROIDISM DUE TO ACQUIRED ATROPHY OF THYROID: Primary | ICD-10-CM

## 2022-08-30 RX ORDER — LEVOTHYROXINE SODIUM 0.07 MG/1
75 TABLET ORAL
Qty: 90 TABLET | Refills: 0 | Status: SHIPPED | OUTPATIENT
Start: 2022-08-30 | End: 2022-08-31

## 2022-08-30 NOTE — TELEPHONE ENCOUNTER
Reason for Disposition   [1] Prescription prescribed recently is not at pharmacy AND [2] triager has access to patient's EMR AND [3] prescription is recorded in the EMR    Answer Assessment - Initial Assessment Questions  1  DRUG NAME: "What medicine do you need to have refilled?"      Thyroid med is not at pharmacy like she was told it would be  2  REFILLS REMAINING: "How many refills are remaining?" (Note: The label on the medicine or pill bottle will show how many refills are remaining  If there are no refills remaining, then a renewal may be needed )      None-dosage changed to 75mcgs  3  EXPIRATION DATE: "What is the expiration date?" (Note: The label states when the prescription will , and thus can no longer be refilled )      N/A  4  PRESCRIBING HCP: "Who prescribed it?" Reason: If prescribed by specialist, call should be referred to that group  Dr Obdulio Oviedo  5  SYMPTOMS: "Do you have any symptoms?"      Needs lower dose because 100mcgs  Was giving her heart palpitations  6   PREGNANCY: "Is there any chance that you are pregnant?" "When was your last menstrual period?"      N/A    Protocols used: MED[facility] REFI[facility] RENEWAL [facility]

## 2022-08-30 NOTE — TELEPHONE ENCOUNTER
Patient takes her Thyroid med at 0200 and needs it for the early morning  She states that she was told it would be sent over and it is not there  It was still pending in the chart and [facility] [address]  I reviewed with her that Aleyda Do is in her chart to be drawn in 6 weeks  She understands

## 2022-08-30 NOTE — TELEPHONE ENCOUNTER
Is having a heavy heart pcp did tsh came back at  01  Is getting an echo did a halter monitor and came back normal   Would like to see if she should tirosint down from 100 micro grams to maybe 75 micro grams    She is having heart palipations is very concerned please call asap

## 2022-08-30 NOTE — TELEPHONE ENCOUNTER
Regarding: med dispensing issue/rx not sent to pharmacy   ----- Message from [facility] sent at 8/30/2022  6:37 PM EDT -----  "Im calling because I spoke with Dr Jyoti Travis office and they told me they would be sending in 75 mcg of my thyroid medication, Tirosint to my [facility] [address], I called them and they said they dont have it and the office told me it would be sent in for me   I only have 100 mcg and I dont want to take that as it makes my heart race "

## 2022-08-31 DIAGNOSIS — E03.9 ACQUIRED HYPOTHYROIDISM: Primary | ICD-10-CM

## 2022-08-31 RX ORDER — LEVOTHYROXINE SODIUM 75 UG/1
CAPSULE ORAL
Qty: 90 CAPSULE | Refills: 3 | Status: SHIPPED | OUTPATIENT
Start: 2022-08-31

## 2022-08-31 NOTE — TELEPHONE ENCOUNTER
Spoke with patient this morning , she is aware that the prescription was send to her [facility] pharmacy

## 2022-09-20 ENCOUNTER — CONSULT (OUTPATIENT)
Dept: GASTROENTEROLOGY | Facility: CLINIC | Age: 62
End: 2022-09-20
Payer: COMMERCIAL

## 2022-09-20 VITALS
HEART RATE: 77 BPM | HEIGHT: 65 IN | TEMPERATURE: 98.7 F | SYSTOLIC BLOOD PRESSURE: 129 MMHG | DIASTOLIC BLOOD PRESSURE: 74 MMHG | WEIGHT: 121 LBS | BODY MASS INDEX: 20.16 KG/M2

## 2022-09-20 DIAGNOSIS — Z11.59 NEED FOR HEPATITIS C SCREENING TEST: ICD-10-CM

## 2022-09-20 DIAGNOSIS — K58.2 IRRITABLE BOWEL SYNDROME WITH BOTH CONSTIPATION AND DIARRHEA: ICD-10-CM

## 2022-09-20 DIAGNOSIS — D12.6 TUBULAR ADENOMA OF COLON: Primary | ICD-10-CM

## 2022-09-20 PROCEDURE — 99214 OFFICE O/P EST MOD 30 MIN: CPT | Performed by: INTERNAL MEDICINE

## 2022-09-20 RX ORDER — BUPROPION HYDROCHLORIDE 150 MG/1
TABLET ORAL
COMMUNITY
Start: 2022-09-12

## 2022-09-20 NOTE — PROGRESS NOTES
[facility] - Outpatient Consultation  Kalpana Esparza 65 y o  female MRN: [id number]  Encounter: [id number]          ASSESSMENT AND PLAN:  65year old female here for evaluation  1  Tubular adenoma of colon, I did her colonoscopy in 2020  -repeat colonoscopy in 5 years    2  Need for hepatitis C screening test  -Hep C Ab  - Hepatitis C antibody; Future    3  Irritable bowel syndrome with both constipation and diarrhea  - Celiac Disease Panel; Future  - H  pylori antigen, stool; Future  - low fodmap diet     follow-up in a few months time          ______________________________________________________________________    HPI:  65year old female referred by PCP  She reports some nausea recently and also with some urgency to have a BM  Reports some alternating constipation where she doesn't have a BM for a few days and then will have looser stools  At it's worst in August, she didn't have a BM for one week  Reports a headache last night and reports some clogging in her ears recently  Saw ENT and reports that she was told by ENT that it was fine  The pt also reports fatigue any laying in bed all day and saw Psychiatrist recently and was started on welbutrin recently  The pt sees Dr Dorita Martin in endocrinology for hypothyroidism and had been on synthroid but now is on tirosint  REVIEW OF SYSTEMS:    CONSTITUTIONAL: Denies any fever, chills, rigors, and weight loss  HEENT: No earache or tinnitus  Denies hearing loss or visual disturbances  CARDIOVASCULAR: No chest pain or palpitations  RESPIRATORY: Denies any cough, hemoptysis, shortness of breath or dyspnea on exertion  GASTROINTESTINAL: As noted in the History of Present Illness  GENITOURINARY: No problems with urination  Denies any hematuria or dysuria  NEUROLOGIC: No dizziness or vertigo, denies headaches  MUSCULOSKELETAL: Denies any muscle or joint pain  SKIN: Denies skin rashes or itching     ENDOCRINE: Denies excessive thirst  Denies intolerance to heat or cold  PSYCHOSOCIAL: Denies depression or anxiety  Denies any recent memory loss         Historical Information   Past Medical History:   Diagnosis Date    Anxiety     Basal cell carcinoma (BCC)     shoulder    Cancer ([address] )     Right    Chronic kidney disease     Colon polyp     Disease of thyroid gland     Skin cancer of chest, excluding breast      Past Surgical History:   Procedure Laterality Date    BREAST EXCISIONAL BIOPSY Right     age 36-lymph nodes-benign    COLONOSCOPY      HYSTERECTOMY  1993    RENAL CRYOABLATION Right 08/30/2016     Social History   Social History     Substance and Sexual Activity   Alcohol Use No     Social History     Substance and Sexual Activity   Drug Use No     Social History     Tobacco Use   Smoking Status Never Smoker   Smokeless Tobacco Never Used     Family History   Problem Relation Age of Onset    Kidney disease Mother 28    Lung cancer Father 68    Anxiety disorder Sister     No Known Problems Daughter     No Known Problems Paternal Aunt     Anxiety disorder Sister     Anxiety disorder Sister     No Known Problems Daughter     Anxiety disorder Brother        Meds/Allergies       Current Outpatient Medications:     Ascorbic Acid (VITAMIN C PO)    atorvastatin (LIPITOR) 10 mg tablet    buPROPion (WELLBUTRIN XL) 150 mg 24 hr tablet    cyanocobalamin (VITAMIN B-12) 100 mcg tablet    DULoxetine (CYMBALTA) 60 mg delayed release capsule    gabapentin (NEURONTIN) 300 mg capsule    ibuprofen (MOTRIN) 600 mg tablet    losartan (COZAAR) 25 mg tablet    Magnesium 100 MG TABS    POTASSIUM CHLORIDE ER PO    Tirosint 75 MCG CAPS    traZODone (DESYREL) 100 mg tablet    Cholecalciferol 2000 units CAPS    escitalopram (LEXAPRO) 20 mg tablet    estradiol (Vagifem) 10 MCG TABS vaginal tablet    No Known Allergies        Objective     Blood pressure 129/74, pulse 77, temperature 98 7 °F (37 1 °C), temperature source Tympanic, height 5' 5" (1 651 m), weight 54 9 kg (121 lb)  Body mass index is 20 14 kg/m²  PHYSICAL EXAM:      General Appearance:   Alert, cooperative, no distress   HEENT:   Normocephalic, atraumatic, anicteric      Neck:  Supple, symmetrical, trachea midline   Lungs:   Clear to auscultation bilaterally; no rales, rhonchi or wheezing; respirations unlabored    Heart[de-identified]   Regular rate and rhythm; no murmur, rub, or gallop  Abdomen:   Soft, non-tender, non-distended; normal bowel sounds; no masses, no organomegaly    Genitalia:   Deferred    Rectal:   Deferred    Extremities:  No cyanosis, clubbing or edema    Pulses:  2+ and symmetric    Skin:  No jaundice, rashes, or lesions    Lymph nodes:  No palpable cervical lymphadenopathy        Lab Results:   No visits with results within 1 Day(s) from this visit  Latest known visit with results is:   Office Visit on 04/14/2022   Component Date Value    Throat Culture 04/14/2022 Negative for beta-hemolytic Streptococcus      RAPID STREP A 04/14/2022 Negative     SARS-CoV-2 04/14/2022 Negative     INFLUENZA A PCR 04/14/2022 Negative     INFLUENZA B PCR 04/14/2022 Negative          Radiology Results:   No results found  Answers for HPI/ROS submitted by the patient on 9/19/2022  anorexia: Yes  constipation: Yes  diarrhea: Yes  fever: No  flatus: Yes  frequency: Yes  headaches: No  hematochezia: No  hematuria: No  melena:  Yes  myalgias: No  nausea: Yes  weight loss: No  vomiting: No  Aggravated by: bowel movement  Relieved by: bowel movements  Diagnostic workup: CT scan

## 2022-09-24 LAB
HCV AB S/CO SERPL IA: 0.05
HCV AB SERPL QL IA: NORMAL

## 2022-09-27 LAB
HCV AB S/CO SERPL IA: 0.05
HCV AB SERPL QL IA: NORMAL
IGA SERPL-MCNC: 114 MG/DL (ref 70–320)
MICRODELETION SYND BLD/T FISH: NORMAL
TTG IGA SER-ACNC: <1 U/ML

## 2022-09-28 ENCOUNTER — OFFICE VISIT (OUTPATIENT)
Dept: URGENT CARE | Facility: CLINIC | Age: 62
End: 2022-09-28
Payer: COMMERCIAL

## 2022-09-28 VITALS
DIASTOLIC BLOOD PRESSURE: 72 MMHG | RESPIRATION RATE: 18 BRPM | SYSTOLIC BLOOD PRESSURE: 124 MMHG | OXYGEN SATURATION: 96 % | HEART RATE: 60 BPM | TEMPERATURE: 96.9 F

## 2022-09-28 DIAGNOSIS — H66.002 NON-RECURRENT ACUTE SUPPURATIVE OTITIS MEDIA OF LEFT EAR WITHOUT SPONTANEOUS RUPTURE OF TYMPANIC MEMBRANE: Primary | ICD-10-CM

## 2022-09-28 PROCEDURE — 99213 OFFICE O/P EST LOW 20 MIN: CPT

## 2022-09-28 RX ORDER — AMOXICILLIN 500 MG/1
500 CAPSULE ORAL EVERY 12 HOURS SCHEDULED
Qty: 14 CAPSULE | Refills: 0 | Status: SHIPPED | OUTPATIENT
Start: 2022-09-28 | End: 2022-10-05

## 2022-09-28 NOTE — PROGRESS NOTES
[address]        NAME: Sarah Garcia is a 65 y o  female  : 1960    MRN: [id number]  DATE: 2022  TIME: 1:33 PM    Assessment and Plan   Non-recurrent acute suppurative otitis media of left ear without spontaneous rupture of tympanic membrane [H66 002]  1  Non-recurrent acute suppurative otitis media of left ear without spontaneous rupture of tympanic membrane  amoxicillin (AMOXIL) 500 mg capsule         Patient Instructions     Recent tx with Keflex for UTI  Discussed risk/benefit of starting another antibiotic versus watch & wait  Pt would like to start Amoxil  Cautioned on GI side effects  F/u with ENT  Follow up with PCP in 2-3 days  Proceed to ER if symptoms worsen  Chief Complaint     Chief Complaint   Patient presents with    Earache     Patient with left ear ache since 2 weeks ago  History of Present Illness     65 y o  F presents with L ear pain x 2 weeks  Denies trouble hearing  Denies trauma or injury  Denies dizziness or headache  Denies URI symptoms  PMH bilateral ear pain - sees ENT  Denies swimming  Using OTC drops with little relief  Review of Systems   Review of Systems   Constitutional: Negative for chills, fatigue and fever  HENT: Positive for ear pain  Negative for congestion, facial swelling, hearing loss, rhinorrhea, sinus pressure, sneezing, sore throat and trouble swallowing  Eyes: Negative for pain, redness and visual disturbance  Respiratory: Negative for cough, chest tightness, shortness of breath and wheezing  Cardiovascular: Negative for chest pain and palpitations  Gastrointestinal: Negative for abdominal pain, diarrhea, nausea and vomiting  Genitourinary: Negative for dysuria, flank pain, hematuria and pelvic pain  Musculoskeletal: Negative for arthralgias, back pain and myalgias  Skin: Negative for color change and rash     Neurological: Negative for dizziness, seizures, syncope, weakness, light-headedness and headaches  Psychiatric/Behavioral: Negative for confusion, hallucinations and sleep disturbance  The patient is not nervous/anxious  All other systems reviewed and are negative          Current Medications       Current Outpatient Medications:     amoxicillin (AMOXIL) 500 mg capsule, Take 1 capsule (500 mg total) by mouth every 12 (twelve) hours for 7 days, Disp: 14 capsule, Rfl: 0    Ascorbic Acid (VITAMIN C PO), Vitamin C (ascorbate calcium), Disp: , Rfl:     atorvastatin (LIPITOR) 10 mg tablet, TAKE 1 TABLET BY MOUTH EVERY DAY AT NIGHT, Disp: , Rfl:     buPROPion (WELLBUTRIN XL) 150 mg 24 hr tablet, bupropion HCl  mg 24 hr tablet, extended release, Disp: , Rfl:     Cholecalciferol 2000 units CAPS, Take 1 capsule by mouth, Disp: , Rfl:     cyanocobalamin (VITAMIN B-12) 100 mcg tablet, B12 Active, Disp: , Rfl:     DULoxetine (CYMBALTA) 60 mg delayed release capsule, Take 120 mg by mouth daily, Disp: , Rfl:     escitalopram (LEXAPRO) 20 mg tablet, TK 1 T PO QD, Disp: , Rfl: 1    estradiol (Vagifem) 10 MCG TABS vaginal tablet, Insert 1 tablet (10 mcg total) into the vagina daily for 14 days, THEN 1 tablet (10 mcg total) 2 (two) times a week , Disp: 24 tablet, Rfl: 3    gabapentin (NEURONTIN) 300 mg capsule, , Disp: , Rfl:     ibuprofen (MOTRIN) 600 mg tablet, , Disp: , Rfl:     losartan (COZAAR) 25 mg tablet, TK 1 T PO QD, Disp: , Rfl: 0    Magnesium 100 MG TABS, Take by mouth, Disp: , Rfl:     POTASSIUM CHLORIDE ER PO, potassium 408 mg-bert 100 mg-magnes 120 mg-lennox 2 mg oral powder pack, Disp: , Rfl:     Tirosint 75 MCG CAPS, Take one capsule on empty stomach daily, Disp: 90 capsule, Rfl: 3    traZODone (DESYREL) 100 mg tablet, , Disp: , Rfl:     Current Allergies     Allergies as of 09/28/2022    (No Known Allergies)            The following portions of the patient's history were reviewed and updated as appropriate: allergies, current medications, past family history, past medical history, past social history, past surgical history and problem list      Past Medical History:   Diagnosis Date    Anxiety     Basal cell carcinoma (BCC)     shoulder    Cancer ([address] )     Right    Chronic kidney disease     Colon polyp     Disease of thyroid gland     Skin cancer of chest, excluding breast        Past Surgical History:   Procedure Laterality Date    BREAST EXCISIONAL BIOPSY Right     age 36-lymph nodes-benign    COLONOSCOPY      HYSTERECTOMY  1993    RENAL CRYOABLATION Right 08/30/2016       Family History   Problem Relation Age of Onset    Kidney disease Mother 28    Lung cancer Father 68    Anxiety disorder Sister     No Known Problems Daughter     No Known Problems Paternal Aunt     Anxiety disorder Sister     Anxiety disorder Sister     No Known Problems Daughter     Anxiety disorder Brother          Medications have been verified  Objective   /72   Pulse 60   Temp (!) 96 9 °F (36 1 °C) (Tympanic)   Resp 18   SpO2 96%   No LMP recorded  Patient has had a hysterectomy  Physical Exam     Physical Exam  Vitals and nursing note reviewed  Constitutional:       General: She is not in acute distress  Appearance: Normal appearance  She is not toxic-appearing  HENT:      Head: Normocephalic  Right Ear: Tympanic membrane normal  Tympanic membrane is not erythematous or bulging  Left Ear: Tympanic membrane is erythematous and bulging (mild)  Nose: No congestion or rhinorrhea  Right Sinus: No maxillary sinus tenderness or frontal sinus tenderness  Left Sinus: No maxillary sinus tenderness or frontal sinus tenderness  Mouth/Throat:      Pharynx: Uvula midline  No oropharyngeal exudate, posterior oropharyngeal erythema or uvula swelling  Tonsils: No tonsillar exudate or tonsillar abscesses  Eyes:      Extraocular Movements: Extraocular movements intact        Conjunctiva/sclera: Conjunctivae normal       Pupils: Pupils are equal, round, and reactive to light  Cardiovascular:      Rate and Rhythm: Normal rate and regular rhythm  Pulses: Normal pulses  Heart sounds: Normal heart sounds  Pulmonary:      Effort: Pulmonary effort is normal  No tachypnea or respiratory distress  Breath sounds: Normal breath sounds and air entry  No decreased breath sounds, wheezing, rhonchi or rales  Abdominal:      General: Bowel sounds are normal       Palpations: Abdomen is soft  Tenderness: There is no abdominal tenderness  There is no right CVA tenderness or guarding  Musculoskeletal:         General: Normal range of motion  Cervical back: Normal range of motion  Lymphadenopathy:      Cervical: No cervical adenopathy  Skin:     General: Skin is warm and dry  Neurological:      General: No focal deficit present  Mental Status: She is alert  Cranial Nerves: Cranial nerves are intact  Sensory: Sensation is intact  Motor: Motor function is intact  Coordination: Coordination is intact  Gait: Gait is intact  Deep Tendon Reflexes: Reflexes are normal and symmetric

## 2022-10-10 ENCOUNTER — TELEPHONE (OUTPATIENT)
Dept: OBGYN CLINIC | Facility: CLINIC | Age: 62
End: 2022-10-10

## 2022-10-10 DIAGNOSIS — Z12.31 BREAST CANCER SCREENING BY MAMMOGRAM: Primary | ICD-10-CM

## 2022-10-10 NOTE — TELEPHONE ENCOUNTER
Called patient will call back let patient know mammogram order was placed  needs to schedule annual appointment

## 2022-10-27 ENCOUNTER — TELEPHONE (OUTPATIENT)
Dept: OBGYN CLINIC | Facility: CLINIC | Age: 62
End: 2022-10-27

## 2022-10-27 NOTE — TELEPHONE ENCOUNTER
Pt has been having breast burning in rt breast since the weekend, not comfortable seeing anyone but you, no openings,please advise

## 2022-10-27 NOTE — TELEPHONE ENCOUNTER
[facility] is prov,   Pt having L Breast  pain for 2 days, her mammo is scheduled for 12/5 but calling for a Diagnostic RX,  Pt would like a call to discuss this first,     Thanks

## 2022-10-31 ENCOUNTER — OFFICE VISIT (OUTPATIENT)
Dept: OBGYN CLINIC | Facility: CLINIC | Age: 62
End: 2022-10-31

## 2022-10-31 VITALS — DIASTOLIC BLOOD PRESSURE: 82 MMHG | SYSTOLIC BLOOD PRESSURE: 140 MMHG

## 2022-10-31 DIAGNOSIS — N63.22 MASS OF UPPER INNER QUADRANT OF LEFT BREAST: Primary | ICD-10-CM

## 2022-11-02 NOTE — PROGRESS NOTES
GYN Problem Visit    Duane Broody is an 65 y o  female who presents for evaluation of a breast mass  Change was noted 1 week ago, and has been stable since first identified  Patient does routinely do self breast exams  The mass is tender  Patient denies nipple discharge  She reports intermittent burning over the area as well as waxing/waning of swollen lymph nodes in the left axilla  The following portions of the patient's history were reviewed and updated as appropriate: allergies, current medications, past family history, past medical history, past social history, past surgical history and problem list     Review of Systems  Pertinent items are noted in HPI       Objective     Vitals:    10/31/22 1250   BP: 140/82     Physical Exam  Constitutional:       Appearance: Normal appearance  Genitourinary:   Breasts: Breasts are symmetrical       Right: No inverted nipple, mass, nipple discharge, skin change, tenderness, axillary adenopathy or supraclavicular adenopathy  Left: Mass and tenderness present  No inverted nipple, nipple discharge, skin change, axillary adenopathy or supraclavicular adenopathy  Breast exam comments: Mobile 1 5cm mass in LUOQ; smooth edges  HENT:      Head: Normocephalic  Cardiovascular:      Rate and Rhythm: Normal rate and regular rhythm  Pulmonary:      Effort: Pulmonary effort is normal    Chest:       Abdominal:      General: There is no distension  Palpations: Abdomen is soft  Musculoskeletal:         General: No swelling  Lymphadenopathy:      Upper Body:      Right upper body: No supraclavicular or axillary adenopathy  Left upper body: No supraclavicular or axillary adenopathy  Neurological:      General: No focal deficit present  Mental Status: She is alert and oriented to person, place, and time  Skin:     General: Skin is warm and dry  Coloration: Skin is not jaundiced or pale     Psychiatric:         Mood and Affect: Mood normal  Behavior: Behavior normal    Vitals reviewed  Assessment/Plan      Mass of upper inner quadrant of left breast    - Mammo diagnostic left w 3d & cad; Future  - US breast left limited (diagnostic);  Future

## 2022-11-08 ENCOUNTER — HOSPITAL ENCOUNTER (OUTPATIENT)
Dept: MAMMOGRAPHY | Facility: CLINIC | Age: 62
Discharge: HOME | End: 2022-11-08

## 2022-11-08 ENCOUNTER — HOSPITAL ENCOUNTER (OUTPATIENT)
Dept: ULTRASOUND IMAGING | Facility: CLINIC | Age: 62
Discharge: HOME | End: 2022-11-08

## 2022-11-08 ENCOUNTER — TELEPHONE (OUTPATIENT)
Dept: OBGYN CLINIC | Facility: CLINIC | Age: 62
End: 2022-11-08

## 2022-11-08 ENCOUNTER — LAB (OUTPATIENT)
Dept: LAB | Facility: CLINIC | Age: 62
End: 2022-11-08

## 2022-11-08 VITALS — BODY MASS INDEX: 20.2 KG/M2 | WEIGHT: 121.25 LBS | HEIGHT: 65 IN

## 2022-11-08 DIAGNOSIS — N63.22 MASS OF UPPER INNER QUADRANT OF LEFT BREAST: ICD-10-CM

## 2022-11-08 DIAGNOSIS — N63.22 MASS OF UPPER INNER QUADRANT OF LEFT BREAST: Primary | ICD-10-CM

## 2022-11-08 DIAGNOSIS — E03.9 ACQUIRED HYPOTHYROIDISM: ICD-10-CM

## 2022-11-08 LAB
T4 FREE SERPL-MCNC: 0.91 NG/DL (ref 0.76–1.46)
TSH SERPL DL<=0.05 MIU/L-ACNC: 0.44 UIU/ML (ref 0.45–4.5)

## 2022-11-08 NOTE — TELEPHONE ENCOUNTER
----- Message from Arnel Moore MD sent at 11/8/2022  3:03 PM EST -----  Please notify patient that studies are negative  I want her to see Dr Ethan Pugh for a second opinion

## 2022-11-08 NOTE — TELEPHONE ENCOUNTER
Per comm consent lm to pt with results and recommendations of imaging from [facility]  NEG    Referral placed for DR Doc Saenz

## 2022-11-09 ENCOUNTER — DOCUMENTATION (OUTPATIENT)
Dept: HEMATOLOGY ONCOLOGY | Facility: CLINIC | Age: 62
End: 2022-11-09

## 2022-11-09 NOTE — RESULT ENCOUNTER NOTE
TSH is below target but the free T4 is below target as well  If you are feeling well, I do not want to make any changes  Please let us know how your feeling to help us decide if we need to make any changes here medication

## 2022-11-11 ENCOUNTER — TELEPHONE (OUTPATIENT)
Dept: SURGICAL ONCOLOGY | Facility: CLINIC | Age: 62
End: 2022-11-11

## 2022-11-11 ENCOUNTER — TELEPHONE (OUTPATIENT)
Dept: OTHER | Facility: OTHER | Age: 62
End: 2022-11-11

## 2022-11-11 NOTE — TELEPHONE ENCOUNTER
Verner Parsons has an appt with Dr Rehana Parson 01/03/23 for a breast lump  Attempted calling her to offer her a sooner appt with a CRNP for consult  Pt shared she has seen a CRNP at [facility] yesterday and would like to cancel her appt with Dr Rehana Parson

## 2022-11-14 ENCOUNTER — TELEPHONE (OUTPATIENT)
Dept: ENDOCRINOLOGY | Facility: CLINIC | Age: 62
End: 2022-11-14

## 2022-11-14 ENCOUNTER — TELEPHONE (OUTPATIENT)
Dept: OBGYN CLINIC | Facility: CLINIC | Age: 62
End: 2022-11-14

## 2022-11-14 NOTE — TELEPHONE ENCOUNTER
----- Message from Gurpreet Menchaca MD sent at 11/8/2022  7:25 PM EST -----  TSH is below target but the free T4 is below target as well  If you are feeling well, I do not want to make any changes  Please let us know how your feeling to help us decide if we need to make any changes here medication

## 2022-11-14 NOTE — TELEPHONE ENCOUNTER
Pt is very upset over a recent cancer scare and would like to find out how to speak to a manager about the person who performed her jennifer in [facility]

## 2022-11-16 NOTE — TELEPHONE ENCOUNTER
Spoke with patient  Very pleasant  This is being taken care of by [facility])  Will let Dr Michele Muñoz know that she was not able to get into Dr Rogers Renteria until January  She received an appointment with Dr Carla Ahuja and the NP  She is having MRI Dec 7th on [facility])

## 2023-01-05 ENCOUNTER — HOSPITAL ENCOUNTER (OUTPATIENT)
Facility: MEDICAL CENTER | Age: 63
Discharge: HOME | End: 2023-01-05

## 2023-01-05 ENCOUNTER — TELEPHONE (OUTPATIENT)
Dept: ENDOCRINOLOGY | Facility: CLINIC | Age: 63
End: 2023-01-05

## 2023-01-05 DIAGNOSIS — M25.512 LEFT SHOULDER PAIN, UNSPECIFIED CHRONICITY: ICD-10-CM

## 2023-01-05 DIAGNOSIS — E03.9 HYPOTHYROIDISM, UNSPECIFIED TYPE: Primary | ICD-10-CM

## 2023-01-17 ENCOUNTER — EVALUATION (OUTPATIENT)
Dept: PHYSICAL THERAPY | Facility: MEDICAL CENTER | Age: 63
End: 2023-01-17

## 2023-01-17 DIAGNOSIS — M54.2 CHRONIC NECK PAIN: Primary | ICD-10-CM

## 2023-01-17 DIAGNOSIS — G89.29 CHRONIC NECK PAIN: Primary | ICD-10-CM

## 2023-01-17 NOTE — TELEPHONE ENCOUNTER
Additional Information  • Negative: Is this related to a work injury? • Negative: Is this related to an MVA? • Negative: Are you currently recieving homecare services? • Negative: Has the patient had unexplained weight loss? • Negative: Does the patient have a fever? • Negative: Is the patient experiencing urine retention? • Negative: Is the patient experiencing acute drop foot or paralysis? • Negative: Has the patient experienced major trauma? (fall from height, high speed collision, direct blow to spine) and is also experiencing nausea, light-headedness, or loss of consciousness? • Negative: Is the patient experiencing blood in sputum?     Protocols used: [facility] COMPREHENSIVE SPINE PROGRAM PROTOCOL

## 2023-01-17 NOTE — PROGRESS NOTES
PT Evaluation     Today's date: 2023  Patient name: Juliana Renteria  : 1960  MRN: [id number]  Referring provider: Christy Reilly DO  Dx:   Encounter Diagnosis     ICD-10-CM    1  Chronic neck pain  M54 2     G89 29                      Assessment/Plan  Patient is a very pleasant 58 yo female who presents with symptoms of left sided neck pain with C4-5 radiculopathy left  Patient's symptoms are consistent with transverse foraminal stenosis and ERSL somatic dysfunction of C4-6  Patient will benefit from skilled PT services to address her issues of pain dand return to normla level of function and exercise  Patient is expected to do well based on response to initial treatment and reducing in pain with distraction and manual therapy  Subjective  Patient states that she has been haivn g neck pain since later November when she was 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 [employer]  Symptoms of left arm pain with neck discomfort and limited motion have been occurring since that time  She does have a long history of lower back and neck issues but never this bad  Patient would like to be able to play Prong ball this spring and is interested in reducing her pain and returning to normal function and playing with her grandchildren       Objective  Red Flags: none  Pain: 2-8/10 pain mostly with spasm   Reflexes:     Right Left   Biceps 2+ 1+   Triceps 2+ 2+   Brachioradialis 2+ 2+   Patellar 1+ 1+   Achilles 1+ 1+   Inverted supinator sign neg neg   clonus neg neg   Babinski neg neg    Posture: WFL  AROM: limited with cervical extension and flexion with pain into the left C4-5 dermatome  PROM: deferred  PAROM: diminished at C4-5 with comparable sign  Palpation: pain throughout left posterior musculature with spasm present   Special Tests: + cervical distraction, + ULTTA, + left spurlings,   Coordination of Movement/strengthe: Patient demonstrates poor activation of Longus Capitus and Longus Coli with loss of feed forward mechanism with reaching tasks  Patient was able to perform activation with cueing    Goals  - Pt I with initial HEP in 1-2 visits  - Improve ROM in cervical extension without increased pain  - Improved Longus Coli and Longus Capitus activation and return of feed forward mechanism   - Increase Functional Status Measure measured by FOTO by [facility]  - return to pickleball without pain    Flowsheet Rows    Flowsheet Row Most Recent Value   PT/OT G-Codes    Current Score 39   Projected Score 57             Precautions: hx of cancer

## 2023-01-17 NOTE — TELEPHONE ENCOUNTER
Additional Information  • Negative: Is this related to a work injury? • Negative: Is this related to an MVA? • Negative: Are you currently recieving homecare services? • Negative: Has the patient had unexplained weight loss? • Negative: Does the patient have a fever? • Negative: Is the patient experiencing urine retention?     Protocols used: [facility] COMPREHENSIVE SPINE PROGRAM PROTOCOL

## 2023-01-17 NOTE — TELEPHONE ENCOUNTER
Additional Information  • Negative: Is this related to a work injury? • Negative: Is this related to an MVA? • Negative: Are you currently recieving homecare services? • Negative: Has the patient had unexplained weight loss? • Negative: Does the patient have a fever? • Negative: Is the patient experiencing urine retention? • Negative: Is the patient experiencing acute drop foot or paralysis?     Protocols used: [facility] COMPREHENSIVE SPINE PROGRAM PROTOCOL

## 2023-01-17 NOTE — TELEPHONE ENCOUNTER
Additional Information  • Negative: Is this related to a work injury? • Negative: Is this related to an MVA? • Negative: Are you currently recieving homecare services? • Negative: Has the patient had unexplained weight loss?     Protocols used:  [facility] COMPREHENSIVE SPINE PROGRAM PROTOCOL

## 2023-01-17 NOTE — TELEPHONE ENCOUNTER
Additional Information  • Negative: Is this related to a work injury? • Negative: Is this related to an MVA?     Protocols used: [facility] COMPREHENSIVE SPINE PROGRAM PROTOCOL

## 2023-01-17 NOTE — TELEPHONE ENCOUNTER
Additional Information  • Negative: Is this related to a work injury? • Negative: Is this related to an MVA? • Negative: Are you currently recieving homecare services? • Negative: Has the patient had unexplained weight loss? • Negative: Does the patient have a fever? • Negative: Is the patient experiencing urine retention? • Negative: Is the patient experiencing acute drop foot or paralysis? • Negative: Has the patient experienced major trauma? (fall from height, high speed collision, direct blow to spine) and is also experiencing nausea, light-headedness, or loss of consciousness?     Protocols used: [facility] COMPREHENSIVE SPINE PROGRAM PROTOCOL

## 2023-01-17 NOTE — TELEPHONE ENCOUNTER
Additional Information  • Negative: Is this related to a work injury? • Negative: Is this related to an MVA? • Negative: Are you currently recieving homecare services? • Negative: Has the patient had unexplained weight loss? • Negative: Does the patient have a fever? • Negative: Is the patient experiencing urine retention? • Negative: Is the patient experiencing acute drop foot or paralysis? • Negative: Has the patient experienced major trauma? (fall from height, high speed collision, direct blow to spine) and is also experiencing nausea, light-headedness, or loss of consciousness? • Negative: Is the patient experiencing blood in sputum? • Affirmative: Is this a chronic condition?     Protocols used: [facility] COMPREHENSIVE SPINE PROGRAM PROTOCOL

## 2023-01-17 NOTE — TELEPHONE ENCOUNTER
Additional Information  • Negative: Is this related to a work injury? • Negative: Is this related to an MVA? • Negative: Are you currently recieving homecare services? • Negative: Has the patient had unexplained weight loss? • Negative: Does the patient have a fever?     Protocols used: [facility] COMPREHENSIVE SPINE PROGRAM PROTOCOL

## 2023-01-17 NOTE — TELEPHONE ENCOUNTER
Additional Information  • Negative: Is this related to a work injury?     Protocols used: [facility] COMPREHENSIVE SPINE PROGRAM PROTOCOL

## 2023-01-17 NOTE — TELEPHONE ENCOUNTER
Additional Information  • Negative: Is this related to a work injury? • Negative: Is this related to an MVA? • Negative: Are you currently recieving homecare services?     Protocols used: [facility] COMPREHENSIVE SPINE PROGRAM PROTOCOL

## 2023-01-18 ENCOUNTER — APPOINTMENT (OUTPATIENT)
Dept: PHYSICAL THERAPY | Facility: MEDICAL CENTER | Age: 63
End: 2023-01-18

## 2023-01-23 ENCOUNTER — APPOINTMENT (OUTPATIENT)
Dept: PHYSICAL THERAPY | Facility: MEDICAL CENTER | Age: 63
End: 2023-01-23

## 2023-01-24 ENCOUNTER — OFFICE VISIT (OUTPATIENT)
Dept: PHYSICAL THERAPY | Facility: MEDICAL CENTER | Age: 63
End: 2023-01-24

## 2023-01-24 DIAGNOSIS — G89.29 CHRONIC NECK PAIN: Primary | ICD-10-CM

## 2023-01-24 DIAGNOSIS — M54.2 CHRONIC NECK PAIN: Primary | ICD-10-CM

## 2023-01-24 NOTE — PROGRESS NOTES
Daily Note     Today's date: 2023  Patient name: Moraima Agudelo  : 1960  MRN: [id number]  Referring provider: Casie Lubin DO  Dx:   Encounter Diagnosis     ICD-10-CM    1  Chronic neck pain  M54 2     G89 29                      Subjective: patient states that she was feeling better after being seen for evaluation  It lasted several days however she had a fall from being drowsy from the muscle relaxer  Bumped her head but is feeling ok now  Objective: See treatment diary below      Assessment: Tolerated treatment well  Patient exhibited good technique with therapeutic exercises and would benefit from continued PT  Focus of treatment on manual therapy and traction  Centralized symptoms and improved ROM following treatment  Plan: Continue per plan of care  Precautions: hx of cancer    Daily Treatment Diary     Manual              Left C2-6 UPA  Gr  Iv- 10sec 5            C4-6 right rotation mob Gr   Iv 10sec x5            Percussion  5 min                                          Exercise Diary              UBE             pec stretch             No monnies             Shoulder extension             Scapular retractions             Horizontal abduction             Chin tucks             Thoracic extension over foam roller             Foam roller on wall                                                                                                                                                                Modalities              Mechanical traction 10min 25 lbs  30/10

## 2023-01-25 ENCOUNTER — APPOINTMENT (OUTPATIENT)
Dept: PHYSICAL THERAPY | Facility: MEDICAL CENTER | Age: 63
End: 2023-01-25

## 2023-01-27 ENCOUNTER — APPOINTMENT (OUTPATIENT)
Dept: PHYSICAL THERAPY | Facility: MEDICAL CENTER | Age: 63
End: 2023-01-27

## 2023-01-30 ENCOUNTER — OFFICE VISIT (OUTPATIENT)
Dept: PHYSICAL THERAPY | Facility: MEDICAL CENTER | Age: 63
End: 2023-01-30

## 2023-01-30 DIAGNOSIS — G89.29 CHRONIC NECK PAIN: Primary | ICD-10-CM

## 2023-01-30 DIAGNOSIS — M54.2 CHRONIC NECK PAIN: Primary | ICD-10-CM

## 2023-01-30 NOTE — PROGRESS NOTES
Daily Note     Today's date: 2023  Patient name: Bo Fabian  : 1960  MRN: [id number]  Referring provider: Tara Murrell DO  Dx:   Encounter Diagnosis     ICD-10-CM    1  Chronic neck pain  M54 2     G89 29                      Subjective: patient states that she continues to feel better  Objective: See treatment diary below      Assessment: Tolerated treatment well  Patient exhibited good technique with therapeutic exercises and would benefit from continued PT  Focus of treatment on manual therapy and traction  Centralized symptoms and improved ROM following treatment  Plan: Continue per plan of care  Precautions: hx of cancer    Daily Treatment Diary     Manual              Left C2-6 UPA  Gr  Iv- 10sec 5            C4-6 right rotation mob Gr   Iv 10sec x5            Percussion  5 min                                          Exercise Diary              UBE             pec stretch             No monnies             Shoulder extension             Scapular retractions             Horizontal abduction             Chin tucks             Thoracic extension over foam roller             Foam roller on wall                                                                                                                                                                Modalities              Mechanical traction 10min 25 lbs  30/10

## 2023-02-02 ENCOUNTER — OFFICE VISIT (OUTPATIENT)
Dept: PHYSICAL THERAPY | Facility: MEDICAL CENTER | Age: 63
End: 2023-02-02

## 2023-02-02 DIAGNOSIS — G89.29 CHRONIC NECK PAIN: Primary | ICD-10-CM

## 2023-02-02 DIAGNOSIS — M54.2 CHRONIC NECK PAIN: Primary | ICD-10-CM

## 2023-02-02 NOTE — PROGRESS NOTES
Daily Note     Today's date: 2023  Patient name: Shahid Romero  : 1960  MRN: [id number]  Referring provider: Travis Fox DO  Dx:   Encounter Diagnosis     ICD-10-CM    1  Chronic neck pain  M54 2     G89 29                      Subjective: patient states that she had an increase in pain in her neck and upper back yesterday  Patient woke with the pain  Objective: See treatment diary below      Assessment: Tolerated treatment well  Patient exhibited good technique with therapeutic exercises and would benefit from continued PT  Focus of treatment on manual therapy and traction  Centralized symptoms and improved ROM following treatment  Completely abolished pain today  Plan: Continue per plan of care  Precautions: hx of cancer    Daily Treatment Diary     Manual              Left C2-6 UPA  Gr  Iv- 10sec 5            C4-6 right rotation mob Gr   Iv 10sec x5            Percussion  5 min                                          Exercise Diary              UBE             pec stretch             No monnies             Shoulder extension             Scapular retractions             Horizontal abduction             Chin tucks             Thoracic extension over foam roller             Foam roller on wall                                                                                                                                                                Modalities              Mechanical traction 10min 25 lbs  30/10

## 2023-02-07 ENCOUNTER — OFFICE VISIT (OUTPATIENT)
Dept: PHYSICAL THERAPY | Facility: MEDICAL CENTER | Age: 63
End: 2023-02-07

## 2023-02-07 DIAGNOSIS — M54.2 CHRONIC NECK PAIN: Primary | ICD-10-CM

## 2023-02-07 DIAGNOSIS — G89.29 CHRONIC NECK PAIN: Primary | ICD-10-CM

## 2023-02-07 NOTE — PROGRESS NOTES
Daily Note     Today's date: 2023  Patient name: Nela Tena  : 1960  MRN: [id number]  Referring provider: Luca Cerna DO  Dx:   Encounter Diagnosis     ICD-10-CM    1  Chronic neck pain  M54 2     G89 29       Subjective: patient states that she has been feeling really good and may be ready to start going back to [employer]  Objective: See treatment diary below      Assessment: Tolerated treatment well  Patient exhibited good technique with therapeutic exercises and would benefit from continued PT  Focus of treatment on manual therapy and traction  No radiculopathy at this point  Patient only having some cervical stiffness and muscle tenderness parascapularly  Plan: Continue per plan of care  Precautions: hx of cancer    Daily Treatment Diary     Manual              Left C2-6 UPA  Gr  Iv- 10sec 5            C4-6 right rotation mob Gr   Iv 10sec x5            Percussion  5 min                                          Exercise Diary              UBE             pec stretch             No monnies             Shoulder extension             Scapular retractions             Horizontal abduction             Chin tucks             Thoracic extension over foam roller             Foam roller on wall                                                                                                                                                                Modalities              Mechanical traction 10min 25 lbs  30/10

## 2023-02-09 ENCOUNTER — OFFICE VISIT (OUTPATIENT)
Dept: PHYSICAL THERAPY | Facility: MEDICAL CENTER | Age: 63
End: 2023-02-09

## 2023-02-09 DIAGNOSIS — G89.29 CHRONIC NECK PAIN: Primary | ICD-10-CM

## 2023-02-09 DIAGNOSIS — M54.2 CHRONIC NECK PAIN: Primary | ICD-10-CM

## 2023-02-09 NOTE — PROGRESS NOTES
Daily Note     Today's date: 2023  Patient name: Michelle Perdomo  : 1960  MRN: [id number]  Referring provider: Malini Peace DO  Dx:   Encounter Diagnosis     ICD-10-CM    1  Chronic neck pain  M54 2     G89 29         Subjective: patient states that she has been feeling good but some discomfort in her left upper back and into the arm at times  Objective: See treatment diary below      Assessment: Tolerated treatment well  Patient exhibited good technique with therapeutic exercises and would benefit from continued PT  Focus of treatment on manual therapy and traction  No radiculopathy at this point  Patient only having some cervical stiffness and muscle tenderness parascapularly  Plan: Continue per plan of care  Precautions: hx of cancer    Daily Treatment Diary     Manual              Left C2-6 UPA  Gr  Iv- 10sec 5            C4-6 right rotation mob Gr   Iv 10sec x5            Percussion  5 min                                          Exercise Diary              UBE             pec stretch             No monnies             Shoulder extension             Scapular retractions             Horizontal abduction             Chin tucks             Thoracic extension over foam roller             Foam roller on wall                                                                                                                                                                Modalities              Mechanical traction 10min 25 lbs  30/10

## 2023-02-13 ENCOUNTER — APPOINTMENT (OUTPATIENT)
Dept: PHYSICAL THERAPY | Facility: MEDICAL CENTER | Age: 63
End: 2023-02-13

## 2023-02-16 ENCOUNTER — OFFICE VISIT (OUTPATIENT)
Dept: PHYSICAL THERAPY | Facility: MEDICAL CENTER | Age: 63
End: 2023-02-16

## 2023-02-16 DIAGNOSIS — M54.2 CHRONIC NECK PAIN: Primary | ICD-10-CM

## 2023-02-16 DIAGNOSIS — G89.29 CHRONIC NECK PAIN: Primary | ICD-10-CM

## 2023-02-16 NOTE — PROGRESS NOTES
Daily Note     Today's date: 2023  Patient name: Michelle Perdomo  : 1960  MRN: [id number]  Referring provider: Malini Peace DO  Dx:   Encounter Diagnosis     ICD-10-CM    1  Chronic neck pain  M54 2     G89 29         Subjective: patient states that she has been feeling good but some discomfort in her left upper back and into the arm at times  Objective: See treatment diary below      Assessment: Tolerated treatment well  Patient exhibited good technique with therapeutic exercises and would benefit from continued PT  Focus of treatment on manual therapy and traction  No radiculopathy at this point  Patient only having some cervical stiffness and muscle tenderness parascapularly  Plan: Continue per plan of care  Precautions: hx of cancer    Daily Treatment Diary     Manual              Left C2-6 UPA  Gr  Iv- 10sec 5            C4-6 right rotation mob Gr   Iv 10sec x5            Percussion  5 min                                          Exercise Diary              UBE             pec stretch             No monnies             Shoulder extension             Scapular retractions             Horizontal abduction             Chin tucks             Thoracic extension over foam roller             Foam roller on wall                                                                                                                                                                Modalities              Mechanical traction 10min 25 lbs  30/10

## 2023-02-22 ENCOUNTER — OFFICE VISIT (OUTPATIENT)
Dept: PHYSICAL THERAPY | Facility: MEDICAL CENTER | Age: 63
End: 2023-02-22

## 2023-02-22 DIAGNOSIS — M54.2 CHRONIC NECK PAIN: Primary | ICD-10-CM

## 2023-02-22 DIAGNOSIS — G89.29 CHRONIC NECK PAIN: Primary | ICD-10-CM

## 2023-02-22 NOTE — PROGRESS NOTES
Daily Note     Today's date: 2023  Patient name: Eduarda Purcell  : 1960  MRN: [id number]  Referring provider: Merline Knack, DO  Dx:   Encounter Diagnosis     ICD-10-CM    1  Chronic neck pain  M54 2     G89 29         Subjective: patient states that she has been feeling good but some discomfort in her left upper back and into the arm at times  Objective: See treatment diary below      Assessment: Tolerated treatment well  Patient exhibited good technique with therapeutic exercises and would benefit from continued PT  Focus of treatment on manual therapy and traction  No radiculopathy at this point  Patient only having some cervical stiffness and muscle tenderness parascapularly  Plan: Continue per plan of care  Precautions: hx of cancer    Daily Treatment Diary     Manual              Left C2-6 UPA  Gr  Iv- 10sec 5            C4-6 right rotation mob Gr   Iv 10sec x5            Percussion  5 min                                          Exercise Diary              UBE             pec stretch             No monnies             Shoulder extension             Scapular retractions             Horizontal abduction                          Thoracic extension over foam roller 10sec x3            Foam roller on wall 10x2                                                                                                                                                               Modalities              Mechanical traction 10min 25 lbs  30/10

## 2023-02-28 ENCOUNTER — APPOINTMENT (OUTPATIENT)
Dept: PHYSICAL THERAPY | Facility: MEDICAL CENTER | Age: 63
End: 2023-02-28

## 2023-04-03 DIAGNOSIS — N95.2 ATROPHIC VAGINITIS: Primary | ICD-10-CM

## 2023-04-03 RX ORDER — ESTRADIOL 0.1 MG/G
1 CREAM VAGINAL 2 TIMES WEEKLY
Qty: 42.5 G | Refills: 2 | Status: SHIPPED | OUTPATIENT
Start: 2023-04-03

## 2023-04-03 NOTE — TELEPHONE ENCOUNTER
Patient left voice message on OB line     She is requesting the vagifem  She said she cx before because it was too expensive- but she decided to use it but she said she wanted a cream if that is possible

## 2023-04-03 NOTE — TELEPHONE ENCOUNTER
Spoke to patient and let her know cream sent in- told her to look into good script also if copay is too high

## 2023-06-07 ENCOUNTER — OFFICE VISIT (OUTPATIENT)
Dept: FAMILY MEDICINE CLINIC | Facility: CLINIC | Age: 63
End: 2023-06-07
Payer: COMMERCIAL

## 2023-06-07 VITALS
HEIGHT: 65 IN | BODY MASS INDEX: 20.99 KG/M2 | OXYGEN SATURATION: 97 % | SYSTOLIC BLOOD PRESSURE: 132 MMHG | DIASTOLIC BLOOD PRESSURE: 80 MMHG | WEIGHT: 126 LBS | TEMPERATURE: 98.5 F | HEART RATE: 109 BPM

## 2023-06-07 DIAGNOSIS — S39.012A STRAIN, SACRAL, INITIAL ENCOUNTER: ICD-10-CM

## 2023-06-07 DIAGNOSIS — M99.01 CERVICAL SOMATIC DYSFUNCTION: ICD-10-CM

## 2023-06-07 DIAGNOSIS — S23.41XA RIB SPRAIN, INITIAL ENCOUNTER: ICD-10-CM

## 2023-06-07 DIAGNOSIS — S39.013A STRAIN OF MUSCLE, FASCIA AND TENDON OF PELVIS, INITIAL ENCOUNTER: ICD-10-CM

## 2023-06-07 DIAGNOSIS — M99.03 SEGMENTAL AND SOMATIC DYSFUNCTION OF LUMBAR REGION: ICD-10-CM

## 2023-06-07 DIAGNOSIS — M99.08 SEGMENTAL AND SOMATIC DYSFUNCTION OF RIB CAGE: ICD-10-CM

## 2023-06-07 DIAGNOSIS — M99.04 SACRAL REGION SOMATIC DYSFUNCTION: ICD-10-CM

## 2023-06-07 DIAGNOSIS — M99.02 SOMATIC DYSFUNCTION OF THORACIC REGION: ICD-10-CM

## 2023-06-07 DIAGNOSIS — M54.9 UPPER BACK PAIN ON LEFT SIDE: ICD-10-CM

## 2023-06-07 DIAGNOSIS — S39.012A LUMBAR STRAIN, INITIAL ENCOUNTER: ICD-10-CM

## 2023-06-07 DIAGNOSIS — G89.29 CHRONIC LEFT-SIDED LOW BACK PAIN WITHOUT SCIATICA: ICD-10-CM

## 2023-06-07 DIAGNOSIS — M54.2 NECK PAIN: Primary | ICD-10-CM

## 2023-06-07 DIAGNOSIS — S16.1XXA CERVICAL STRAIN, INITIAL ENCOUNTER: ICD-10-CM

## 2023-06-07 DIAGNOSIS — M54.50 CHRONIC LEFT-SIDED LOW BACK PAIN WITHOUT SCIATICA: ICD-10-CM

## 2023-06-07 DIAGNOSIS — S29.012A STRAIN OF MUSCLE AND TENDON OF BACK WALL OF THORAX, INITIAL ENCOUNTER: ICD-10-CM

## 2023-06-07 DIAGNOSIS — M99.05 PELVIC SOMATIC DYSFUNCTION: ICD-10-CM

## 2023-06-07 PROCEDURE — 98927 OSTEOPATH MANJ 5-6 REGIONS: CPT | Performed by: FAMILY MEDICINE

## 2023-06-07 PROCEDURE — 99205 OFFICE O/P NEW HI 60 MIN: CPT | Performed by: FAMILY MEDICINE

## 2023-06-07 NOTE — PROGRESS NOTES
OMT    Performed by: Norma Nunez DO  Authorized by: Norma Nunez DO  Universal Protocol:  Consent: Verbal consent obtained  Consent given by: patient        Procedure Details:     Region evaluated and treated:  Cervical, Lumbar, Sacrum/Pelvis, Pelvis Innominate, Ribs and Thoracic    Thoracic Information  Thoracic Region: T1 - T4  Cervical Details:     Examination Method:  Asymmetry, Misalignment, Crepitation, Defects, Masses and Range of Motion, Contracture    Severity:  Mild    Treatment Method:  Soft Tissue Treatment, Myofascial Release Treatment and Muscle Energy Treatment    Response:  Improved - The somatic dysfunction is improved but not completely resolved  Thoracic T1 - T4 details:     Examination Method:  Asymmetry, Misalignment, Crepitation, Defects, Masses and Tenderness, Pain    Severity:  Mild    Osteopathic Findings:  T4,3 didn't move  T1 moved  Treatment Method:  Muscle Energy Treatment, Myofascial Release Treatment and Soft Tissue Treatment    Response:  Unchanged    Lumbar details:     Examination Method:  Asymmetry, Misalignment, Crepitation, Defects, Masses and Tenderness, Pain    Severity:  Mild    Treatment Method:  Soft Tissue Treatment and High Velocity, Low Amplitude Treatment    Response:  Resolved - The somatic dysfunction is completely resolved without evidence of it ever having been present  Sacrum/Pelvis details:     Examination Method:  Asymmetry, Misalignment, Crepitation, Defects, Masses    Severity:  Mild    Treatment Method:  Muscle Energy Treatment    Response:  Resolved - The somatic dysfunction is completely resolved without evidence of it ever having been present  Pelvis Innominate details:     Examination Method:  Asymmetry, Misalignment, Crepitation, Defects, Masses and Tenderness, Pain    Severity:  Mild    Osteopathic Findings:  Corrected a left posterior innominate      Treatment Method:  Soft Tissue Treatment, High Velocity, Low Amplitude Treatment and Visceral Manipulative Treatment    Ribs details:     Examination Method:  Asymmetry, Misalignment, Crepitation, Defects, Masses and Tenderness, Pain    Severity:  Mild    Treatment Method:  Muscle Energy Treatment and Soft Tissue Treatment    Response:  Resolved - The somatic dysfunction is completely resolved without evidence of it ever having been present      Total Regions Treated:  6

## 2023-06-07 NOTE — PROGRESS NOTES
Name: Norma Rodriguez      : 1960      MRN: [id number]  Encounter Provider: Tunde Lujan DO  Encounter Date: 2023   Encounter department: [facility]    Assessment & Plan     Pelvis levels out with 4 mm under left shoe  Foot lifts and follow up after using foot lifts for 2 weeks  Chief Complaint   Patient presents with   • New Patient Visit     Pinched nerve/Manip -only               Subjective     First vist here for MS discomforts  Left upper back and lumbar  Low back localized, non-radicular  Upper back/cervical can travel down left arm  Referred by Ashok arango  SH: played many sportsa  Low and upper back for past 2 years  I have spent a total time of 60 minutes on 23 in caring for this patient including Risks and benefits of tx options, Instructions for management, Risk factor reductions and Impressions  Review of Systems   Musculoskeletal: Positive for back pain, neck pain and neck stiffness         Past Medical History:   Diagnosis Date   • Anxiety    • Basal cell carcinoma (BCC)     shoulder   • Cancer (HCC)     Right   • Chronic kidney disease    • Colon polyp    • Disease of thyroid gland    • Skin cancer of chest, excluding breast      Past Surgical History:   Procedure Laterality Date   • BREAST EXCISIONAL BIOPSY Right     age 36-lymph nodes-benign   • COLONOSCOPY     • HYSTERECTOMY     • RENAL CRYOABLATION Right 2016     Family History   Problem Relation Age of Onset   • Kidney disease Mother 28   • Lung cancer Father 68   • Anxiety disorder Sister    • No Known Problems Daughter    • No Known Problems Paternal Aunt    • Anxiety disorder Sister    • Anxiety disorder Sister    • No Known Problems Daughter    • Anxiety disorder Brother      Social History     Socioeconomic History   • Marital status: /Civil Union     Spouse name: None   • Number of children: None   • Years of education: None   • Highest education level: None Occupational History   • None   Tobacco Use   • Smoking status: Never   • Smokeless tobacco: Never   Vaping Use   • Vaping Use: Never used   Substance and Sexual Activity   • Alcohol use: No   • Drug use: No   • Sexual activity: Yes     Partners: Male     Birth control/protection: Female Sterilization   Other Topics Concern   • None   Social History Narrative   • None     Social Determinants of Health     Financial Resource Strain: Not on file   Food Insecurity: Not on file   Transportation Needs: Not on file   Physical Activity: Not on file   Stress: Not on file   Social Connections: Not on file   Intimate Partner Violence: Not on file   Housing Stability: Not on file     Current Outpatient Medications on File Prior to Visit   Medication Sig   • Ascorbic Acid (VITAMIN C PO) Vitamin C (ascorbate calcium)   • atorvastatin (LIPITOR) 10 mg tablet TAKE 1 TABLET BY MOUTH EVERY DAY AT NIGHT   • buPROPion (WELLBUTRIN XL) 150 mg 24 hr tablet bupropion HCl  mg 24 hr tablet, extended release   • cyanocobalamin (VITAMIN B-12) 100 mcg tablet B12 Active   • DULoxetine (CYMBALTA) 60 mg delayed release capsule Take 120 mg by mouth daily   • estradiol (ESTRACE VAGINAL) 0 1 mg/g vaginal cream Insert 1 g into the vagina 2 (two) times a week   • ibuprofen (MOTRIN) 600 mg tablet    • losartan (COZAAR) 25 mg tablet TK 1 T PO QD   • Magnesium 100 MG TABS Take by mouth   • POTASSIUM CHLORIDE ER PO potassium 408 mg-bert 100 mg-magnes 120 mg-lennox 2 mg oral powder pack   • Tirosint 125 MCG CAPS Take 1 capsule (125 mcg total) by mouth daily   • traZODone (DESYREL) 100 mg tablet    • gabapentin (NEURONTIN) 300 mg capsule      No Known Allergies  Immunization History   Administered Date(s) Administered   • COVID-19 MODERNA VACC 0 5 ML IM 04/15/2021, 05/13/2021, 05/03/2022   • INFLUENZA 12/07/2012, 12/01/2014, 10/30/2017, 11/15/2017, 09/29/2018   • Tdap 07/04/2009, 11/15/2017       Objective     /80 (BP Location: Left arm, Patient "Position: Sitting, Cuff Size: Standard)   Pulse (!) 109   Temp 98 5 °F (36 9 °C) (Temporal)   Ht 5' 5\" (1 651 m)   Wt 57 2 kg (126 lb)   SpO2 97%   BMI 20 97 kg/m²     Physical Exam  Musculoskeletal:      Comments: Stand: LPI presentation  Sit:  Rib #1 left anterior and slight superior, Rib #2 anterior at angle on the left, Rib #3 just below is posterior  Rib #4 posterior on the left - palpation of angle reproduces pain  Prone:  Inferior L psis, sacrum: rotated left, L5: rotated left  Supine: Tight cervical soft tissue on the left with decreased translation to the right         Chilango Cost, DO  "

## 2023-06-26 DIAGNOSIS — E06.3 HYPOTHYROIDISM DUE TO HASHIMOTO'S THYROIDITIS: ICD-10-CM

## 2023-06-26 DIAGNOSIS — E03.8 HYPOTHYROIDISM DUE TO HASHIMOTO'S THYROIDITIS: ICD-10-CM

## 2023-06-28 RX ORDER — LEVOTHYROXINE SODIUM 125 UG/1
125 CAPSULE ORAL DAILY
Qty: 90 CAPSULE | Refills: 0 | Status: SHIPPED | OUTPATIENT
Start: 2023-06-28 | End: 2023-09-26

## 2023-06-28 NOTE — TELEPHONE ENCOUNTER
Requested medication(s) are due for refill today: Yes  Patient has already received a courtesy refill: No  Other reason request has been forwarded to provider: Patient hasn't been seen in 3 years, but has an appointment scheduled with you on 7/5/23 (next week)  What would you like to do?

## 2023-07-05 ENCOUNTER — OFFICE VISIT (OUTPATIENT)
Dept: ENDOCRINOLOGY | Facility: CLINIC | Age: 63
End: 2023-07-05
Payer: COMMERCIAL

## 2023-07-05 VITALS
SYSTOLIC BLOOD PRESSURE: 140 MMHG | DIASTOLIC BLOOD PRESSURE: 98 MMHG | BODY MASS INDEX: 21.43 KG/M2 | WEIGHT: 128.6 LBS | HEIGHT: 65 IN | HEART RATE: 98 BPM

## 2023-07-05 DIAGNOSIS — E03.9 ACQUIRED HYPOTHYROIDISM: Primary | ICD-10-CM

## 2023-07-05 DIAGNOSIS — Z90.710 STATUS POST HYSTERECTOMY: ICD-10-CM

## 2023-07-05 PROCEDURE — 99214 OFFICE O/P EST MOD 30 MIN: CPT | Performed by: INTERNAL MEDICINE

## 2023-07-05 NOTE — PROGRESS NOTES
Evens Thompson 59 y.o. female MRN: [id number]    Encounter: [id number]      Assessment/Plan     Assessment: This is a 59y.o.-year-old female with hypothyroidism. Plan:  1. Hypothyroidism-since her dose was recently increased, check TSH and free T4. Based on results I will make additional changes if necessary. CC:   Hypothyroidism    History of Present Illness     HPI:  58-year-old woman with hypothyroidism presents for follow-up. Her Tirosint dose was recently increased by us. She denies any symptoms of hypo or hyperthyroidism. She is dealing with neck pain and bulging disc. She has seen a surgeon. Review of Systems   Constitutional: Negative for chills and fever. Respiratory: Negative for shortness of breath. Cardiovascular: Negative for chest pain. Gastrointestinal: Negative for constipation, diarrhea, nausea and vomiting. Endocrine: Negative for cold intolerance and heat intolerance. All other systems reviewed and are negative.       Historical Information   Past Medical History:   Diagnosis Date   • Anxiety    • Basal cell carcinoma (BCC)     shoulder   • Cancer (HCC)     Right   • Chronic kidney disease    • Colon polyp    • Disease of thyroid gland    • Skin cancer of chest, excluding breast      Past Surgical History:   Procedure Laterality Date   • BREAST EXCISIONAL BIOPSY Right     age 36-lymph nodes-benign   • COLONOSCOPY     • HYSTERECTOMY  1993   • RENAL CRYOABLATION Right 08/30/2016     Social History   Social History     Substance and Sexual Activity   Alcohol Use No     Social History     Substance and Sexual Activity   Drug Use No     Social History     Tobacco Use   Smoking Status Never   Smokeless Tobacco Never     Family History:   Family History   Problem Relation Age of Onset   • Kidney disease Mother 28   • Lung cancer Father 68   • Anxiety disorder Sister    • No Known Problems Daughter    • No Known Problems Paternal Aunt    • Anxiety disorder Sister    • Anxiety disorder Sister    • No Known Problems Daughter    • Anxiety disorder Brother        Meds/Allergies   Current Outpatient Medications   Medication Sig Dispense Refill   • Ascorbic Acid (VITAMIN C PO) Vitamin C (ascorbate calcium)     • atorvastatin (LIPITOR) 10 mg tablet TAKE 1 TABLET BY MOUTH EVERY DAY AT NIGHT     • buPROPion (WELLBUTRIN XL) 150 mg 24 hr tablet 300 mg     • cyanocobalamin (VITAMIN B-12) 100 mcg tablet B12 Active     • DULoxetine (CYMBALTA) 60 mg delayed release capsule Take 120 mg by mouth daily     • estradiol (ESTRACE VAGINAL) 0.1 mg/g vaginal cream Insert 1 g into the vagina 2 (two) times a week 42.5 g 2   • ibuprofen (MOTRIN) 600 mg tablet      • losartan (COZAAR) 25 mg tablet TK 1 T PO QD  0   • Magnesium 100 MG TABS Take by mouth     • POTASSIUM CHLORIDE ER PO potassium 408 mg-bert 100 mg-magnes 120 mg-lennox 2 mg oral powder pack     • Tirosint 125 MCG CAPS TAKE 1 CAPSULE (125 MCG TOTAL) BY MOUTH DAILY 90 capsule 0   • traZODone (DESYREL) 100 mg tablet      • vitamin E, tocopherol, 200 units capsule Take 200 Units by mouth daily     • gabapentin (NEURONTIN) 300 mg capsule        No current facility-administered medications for this visit. No Known Allergies    Objective   Vitals: Blood pressure 140/98, pulse 98, height 5' 5" (1.651 m), weight 58.3 kg (128 lb 9.6 oz). Physical Exam  Vitals reviewed. Constitutional:       General: She is not in acute distress. Appearance: She is well-developed. She is not diaphoretic. HENT:      Head: Normocephalic and atraumatic. Mouth/Throat:      Pharynx: No oropharyngeal exudate. Eyes:      General: Lids are normal. No scleral icterus. Right eye: No discharge. Left eye: No discharge. Conjunctiva/sclera: Conjunctivae normal.   Neck:      Thyroid: No thyromegaly. Cardiovascular:      Rate and Rhythm: Normal rate and regular rhythm. Heart sounds: Normal heart sounds. No murmur heard. No friction rub.  No gallop. Pulmonary:      Effort: Pulmonary effort is normal. No respiratory distress. Breath sounds: Normal breath sounds. No wheezing. Abdominal:      General: Bowel sounds are normal. There is no distension. Palpations: Abdomen is soft. Tenderness: There is no abdominal tenderness. Musculoskeletal:         General: No tenderness or deformity. Normal range of motion. Cervical back: Neck supple. Lymphadenopathy:      Head:      Right side of head: No occipital adenopathy. Left side of head: No occipital adenopathy. Upper Body:      Right upper body: No supraclavicular adenopathy. Left upper body: No supraclavicular adenopathy. Skin:     General: Skin is warm. Findings: No erythema or rash. Neurological:      Mental Status: She is alert and oriented to person, place, and time. Cranial Nerves: No cranial nerve deficit. Psychiatric:         Mood and Affect: Mood normal.         Behavior: Behavior normal.         The history was obtained from the review of the chart, patient. Lab Results:   Lab Results   Component Value Date/Time    TSH 3RD GENERATON 0.438 (L) 11/08/2022 07:59 AM    Free T4 0.91 11/08/2022 07:59 AM       Imaging Studies:       I have personally reviewed pertinent reports. Portions of the record may have been created with voice recognition software. Occasional wrong word or "sound a like" substitutions may have occurred due to the inherent limitations of voice recognition software. Read the chart carefully and recognize, using context, where substitutions have occurred.

## 2023-09-01 DIAGNOSIS — E06.3 HYPOTHYROIDISM DUE TO HASHIMOTO'S THYROIDITIS: ICD-10-CM

## 2023-09-01 DIAGNOSIS — E03.8 HYPOTHYROIDISM DUE TO HASHIMOTO'S THYROIDITIS: ICD-10-CM

## 2023-09-01 RX ORDER — LEVOTHYROXINE SODIUM 125 UG/1
125 CAPSULE ORAL DAILY
Qty: 90 CAPSULE | Refills: 0 | Status: SHIPPED | OUTPATIENT
Start: 2023-09-01 | End: 2023-11-30

## 2023-09-11 ENCOUNTER — LAB (OUTPATIENT)
Dept: LAB | Facility: CLINIC | Age: 63
End: 2023-09-11
Payer: COMMERCIAL

## 2023-09-11 DIAGNOSIS — E03.9 HYPOTHYROIDISM, UNSPECIFIED TYPE: ICD-10-CM

## 2023-09-11 LAB
T4 FREE SERPL-MCNC: 0.98 NG/DL (ref 0.61–1.12)
TSH SERPL DL<=0.05 MIU/L-ACNC: <0.01 UIU/ML (ref 0.45–4.5)

## 2023-09-11 PROCEDURE — 84443 ASSAY THYROID STIM HORMONE: CPT

## 2023-09-11 PROCEDURE — 36415 COLL VENOUS BLD VENIPUNCTURE: CPT

## 2023-09-11 PROCEDURE — 84439 ASSAY OF FREE THYROXINE: CPT

## 2023-09-13 NOTE — RESULT ENCOUNTER NOTE
TSH is very suppressed with a normal free T4 suggesting secondary hypothyroidism. Continue current dose of Tirosint.

## 2023-09-24 DIAGNOSIS — E03.8 HYPOTHYROIDISM DUE TO HASHIMOTO'S THYROIDITIS: ICD-10-CM

## 2023-09-24 DIAGNOSIS — E06.3 HYPOTHYROIDISM DUE TO HASHIMOTO'S THYROIDITIS: ICD-10-CM

## 2023-09-25 ENCOUNTER — TELEPHONE (OUTPATIENT)
Dept: ENDOCRINOLOGY | Facility: CLINIC | Age: 63
End: 2023-09-25

## 2023-09-25 NOTE — TELEPHONE ENCOUNTER
Marcelino Aakash [id number] - PA Case ID: [id number] - Rx #: [id number]  Need help? Call us at [phone]  Status   Sent to [facility]  Drug    Tirosint 125MCG capsules   Form    [employer] Electronic PA Form (8855 NCPDP)           Original Claim Info    76 +MUST USE GENERIC LEVOTHYROXINE ORPREFERRED BRAND SYNTHROID OR MEDICAL NECPA [id number] REQUIRES PRIOR AUTHORIZATIONTRANSMISSION FEE UP TO $0.31 MAY APPLY

## 2023-09-26 RX ORDER — LEVOTHYROXINE SODIUM 125 UG/1
125 CAPSULE ORAL DAILY
Qty: 90 CAPSULE | Refills: 0 | Status: SHIPPED | OUTPATIENT
Start: 2023-09-26 | End: 2023-12-25

## 2023-09-26 NOTE — TELEPHONE ENCOUNTER
Patient l/m stating her Tirosint was denied. Denial letter is in Media. She is down to 1 pill left and would like us to write an appeal letter and fax it to [phone]. She states that she was on generic Levothyroxine and her hair fell out when she was on it. Can you please do this since Dr Carloz Michaels is out?

## 2023-10-04 ENCOUNTER — OFFICE VISIT (OUTPATIENT)
Age: 63
End: 2023-10-04
Payer: COMMERCIAL

## 2023-10-04 ENCOUNTER — APPOINTMENT (OUTPATIENT)
Dept: LAB | Facility: CLINIC | Age: 63
End: 2023-10-04
Payer: COMMERCIAL

## 2023-10-04 ENCOUNTER — TELEPHONE (OUTPATIENT)
Age: 63
End: 2023-10-04

## 2023-10-04 VITALS
BODY MASS INDEX: 20.71 KG/M2 | WEIGHT: 124.47 LBS | SYSTOLIC BLOOD PRESSURE: 124 MMHG | DIASTOLIC BLOOD PRESSURE: 62 MMHG

## 2023-10-04 DIAGNOSIS — R81 GLUCOSURIA: ICD-10-CM

## 2023-10-04 DIAGNOSIS — N39.0 RECURRENT UTI: Primary | ICD-10-CM

## 2023-10-04 DIAGNOSIS — N39.0 RECURRENT UTI: ICD-10-CM

## 2023-10-04 PROBLEM — Z82.49 FAMILY HISTORY OF EARLY CAD: Status: ACTIVE | Noted: 2022-08-08

## 2023-10-04 PROBLEM — F32.2 CURRENT SEVERE EPISODE OF MAJOR DEPRESSIVE DISORDER WITHOUT PSYCHOTIC FEATURES WITHOUT PRIOR EPISODE (HCC): Status: ACTIVE | Noted: 2021-05-18

## 2023-10-04 PROBLEM — F43.10 PTSD (POST-TRAUMATIC STRESS DISORDER): Status: ACTIVE | Noted: 2021-06-16

## 2023-10-04 PROBLEM — R92.8 ABNORMAL MAGNETIC RESONANCE IMAGING OF RIGHT BREAST: Status: ACTIVE | Noted: 2022-10-22

## 2023-10-04 LAB
ALBUMIN SERPL BCP-MCNC: 4.5 G/DL (ref 3.5–5)
ALP SERPL-CCNC: 65 U/L (ref 34–104)
ALT SERPL W P-5'-P-CCNC: 70 U/L (ref 7–52)
ANION GAP SERPL CALCULATED.3IONS-SCNC: 11 MMOL/L
AST SERPL W P-5'-P-CCNC: 55 U/L (ref 13–39)
BASOPHILS # BLD AUTO: 0.05 THOUSANDS/ÂΜL (ref 0–0.1)
BASOPHILS NFR BLD AUTO: 1 % (ref 0–1)
BILIRUB SERPL-MCNC: 0.3 MG/DL (ref 0.2–1)
BUN SERPL-MCNC: 10 MG/DL (ref 5–25)
CALCIUM SERPL-MCNC: 9.8 MG/DL (ref 8.4–10.2)
CHLORIDE SERPL-SCNC: 101 MMOL/L (ref 96–108)
CO2 SERPL-SCNC: 25 MMOL/L (ref 21–32)
CREAT SERPL-MCNC: 0.62 MG/DL (ref 0.6–1.3)
EOSINOPHIL # BLD AUTO: 0.1 THOUSAND/ÂΜL (ref 0–0.61)
EOSINOPHIL NFR BLD AUTO: 2 % (ref 0–6)
ERYTHROCYTE [DISTWIDTH] IN BLOOD BY AUTOMATED COUNT: 12.8 % (ref 11.6–15.1)
EST. AVERAGE GLUCOSE BLD GHB EST-MCNC: 123 MG/DL
GFR SERPL CREATININE-BSD FRML MDRD: 96 ML/MIN/1.73SQ M
GLUCOSE P FAST SERPL-MCNC: 86 MG/DL (ref 65–99)
HBA1C MFR BLD: 5.9 %
HCT VFR BLD AUTO: 43.9 % (ref 34.8–46.1)
HGB BLD-MCNC: 14.7 G/DL (ref 11.5–15.4)
IMM GRANULOCYTES # BLD AUTO: 0.03 THOUSAND/UL (ref 0–0.2)
IMM GRANULOCYTES NFR BLD AUTO: 1 % (ref 0–2)
LYMPHOCYTES # BLD AUTO: 1.43 THOUSANDS/ÂΜL (ref 0.6–4.47)
LYMPHOCYTES NFR BLD AUTO: 23 % (ref 14–44)
MCH RBC QN AUTO: 34.1 PG (ref 26.8–34.3)
MCHC RBC AUTO-ENTMCNC: 33.5 G/DL (ref 31.4–37.4)
MCV RBC AUTO: 102 FL (ref 82–98)
MONOCYTES # BLD AUTO: 0.98 THOUSAND/ÂΜL (ref 0.17–1.22)
MONOCYTES NFR BLD AUTO: 16 % (ref 4–12)
NEUTROPHILS # BLD AUTO: 3.52 THOUSANDS/ÂΜL (ref 1.85–7.62)
NEUTS SEG NFR BLD AUTO: 57 % (ref 43–75)
NRBC BLD AUTO-RTO: 0 /100 WBCS
PLATELET # BLD AUTO: 348 THOUSANDS/UL (ref 149–390)
PMV BLD AUTO: 9.4 FL (ref 8.9–12.7)
POTASSIUM SERPL-SCNC: 4.3 MMOL/L (ref 3.5–5.3)
PROT SERPL-MCNC: 7.6 G/DL (ref 6.4–8.4)
RBC # BLD AUTO: 4.31 MILLION/UL (ref 3.81–5.12)
SODIUM SERPL-SCNC: 137 MMOL/L (ref 135–147)
WBC # BLD AUTO: 6.11 THOUSAND/UL (ref 4.31–10.16)

## 2023-10-04 PROCEDURE — 80053 COMPREHEN METABOLIC PANEL: CPT

## 2023-10-04 PROCEDURE — 83036 HEMOGLOBIN GLYCOSYLATED A1C: CPT

## 2023-10-04 PROCEDURE — 99213 OFFICE O/P EST LOW 20 MIN: CPT | Performed by: OBSTETRICS & GYNECOLOGY

## 2023-10-04 PROCEDURE — 85025 COMPLETE CBC W/AUTO DIFF WBC: CPT

## 2023-10-04 PROCEDURE — 36415 COLL VENOUS BLD VENIPUNCTURE: CPT

## 2023-10-04 RX ORDER — CYCLOBENZAPRINE HCL 10 MG
10 TABLET ORAL
COMMUNITY
Start: 2023-09-01

## 2023-10-04 RX ORDER — FLUOROURACIL 50 MG/G
CREAM TOPICAL
COMMUNITY
Start: 2023-09-01

## 2023-10-04 RX ORDER — GABAPENTIN 400 MG/1
400 CAPSULE ORAL 4 TIMES DAILY
COMMUNITY
Start: 2023-10-02

## 2023-10-04 RX ORDER — CEPHALEXIN 500 MG/1
500 CAPSULE ORAL 3 TIMES DAILY
COMMUNITY

## 2023-10-04 NOTE — PROGRESS NOTES
GYN Problem Visit      HPI:  Patient presents with persistent UTI symptoms s/p two recent E. Coli UTI's. Finishing Keflex currently. She is concerned about ongoing symptoms since she is leaving this Saturday for a medical program out of state. She had significant glucosuria on both UA's. No recent blood testing but has a h/o elevated fasting glucose. PMH, PSH, Meds, Allergies, SocHx, FamHx reviewed and updated where appropriate. Review of Systems   Constitutional: Negative for decreased appetite, diaphoresis, fever and malaise/fatigue. Gastrointestinal: Positive for diarrhea. Negative for bloating, abdominal pain and vomiting. Genitourinary: Positive for frequency and urgency. Negative for non-menstrual bleeding and pelvic pain. Neurological: Negative for dizziness, headaches, light-headedness and weakness. Vitals:    10/04/23 1311   BP: 124/62       Physical Exam  Constitutional:       Appearance: Normal appearance. HENT:      Head: Normocephalic. Cardiovascular:      Rate and Rhythm: Normal rate and regular rhythm. Musculoskeletal:         General: No swelling. Neurological:      General: No focal deficit present. Mental Status: She is alert and oriented to person, place, and time. Skin:     General: Skin is warm and dry. Coloration: Skin is not jaundiced or pale. Psychiatric:         Mood and Affect: Mood normal. Affect is tearful. Behavior: Behavior normal.   Vitals reviewed. Impression/Plan:    1. Recurrent UTI  2. Glucosuria    - Comprehensive metabolic panel; Future  - HEMOGLOBIN A1C W/ EAG ESTIMATION;  Future  - CBC and differential; Future  - if above wnl will have to make plan for transition of medical care, follow up by Saturday

## 2023-12-07 DIAGNOSIS — E06.3 HYPOTHYROIDISM DUE TO HASHIMOTO'S THYROIDITIS: ICD-10-CM

## 2023-12-07 DIAGNOSIS — E03.8 HYPOTHYROIDISM DUE TO HASHIMOTO'S THYROIDITIS: ICD-10-CM

## 2023-12-07 RX ORDER — LEVOTHYROXINE SODIUM 125 UG/1
125 CAPSULE ORAL DAILY
Qty: 90 CAPSULE | Refills: 0 | Status: SHIPPED | OUTPATIENT
Start: 2023-12-07 | End: 2024-03-06

## 2024-01-04 ENCOUNTER — ANNUAL EXAM (OUTPATIENT)
Age: 64
End: 2024-01-04
Payer: COMMERCIAL

## 2024-01-04 VITALS
DIASTOLIC BLOOD PRESSURE: 82 MMHG | WEIGHT: 125.6 LBS | SYSTOLIC BLOOD PRESSURE: 142 MMHG | BODY MASS INDEX: 20.93 KG/M2 | HEIGHT: 65 IN

## 2024-01-04 DIAGNOSIS — Z01.419 ENCOUNTER FOR ANNUAL ROUTINE GYNECOLOGICAL EXAMINATION: Primary | ICD-10-CM

## 2024-01-04 PROCEDURE — S0612 ANNUAL GYNECOLOGICAL EXAMINA: HCPCS | Performed by: OBSTETRICS & GYNECOLOGY

## 2024-01-04 NOTE — PROGRESS NOTES
"  Assessment/Plan:     Diagnoses and all orders for this visit:    Encounter for annual routine gynecological examination         Subjective      Tatyana Davis is a 64 y.o. female who presents for annual exam. The patient has no complaints today. The patient is sexually active.  The patient is not taking hormone replacement therapy. Patient denies post-menopausal vaginal bleeding.  She denies any breast, urinary or sexual concerns.     Menstrual History:  OB History          3    Para   3    Term   3            AB        Living             SAB        IAB        Ectopic        Multiple        Live Births                      No LMP recorded. Patient has had a hysterectomy.     Past Medical History:   Diagnosis Date    Anxiety     Basal cell carcinoma (BCC)     shoulder    Cancer (HCC)     Right    Chronic kidney disease     Colon polyp     Disease of thyroid gland     Skin cancer of chest, excluding breast        Family History   Problem Relation Age of Onset    Kidney disease Mother 32    Lung cancer Father 76    Anxiety disorder Sister     No Known Problems Daughter     No Known Problems Paternal Aunt     Anxiety disorder Sister     Anxiety disorder Sister     No Known Problems Daughter     Anxiety disorder Brother        The following portions of the patient's history were reviewed and updated as appropriate: allergies, current medications, past family history, past medical history, past social history, past surgical history, and problem list.    Review of Systems  Pertinent items are noted in HPI.     Objective      /82 (BP Location: Right arm, Patient Position: Sitting, Cuff Size: Standard)   Ht 5' 5\" (1.651 m)   Wt 57 kg (125 lb 9.6 oz)   BMI 20.90 kg/m²     General:   alert and oriented, in no acute distress   Heart:  Breasts: regular rate and rhythm  appear normal, no suspicious masses, no skin or nipple changes or axillary nodes.   Lungs: Effort normal   Abdomen: soft, non-tender, " without masses or organomegaly   Vulva: normal   Vagina: normal mucosa   Cervix: no lesions   Uterus: normal size, mobile, non-tender   Adnexa: normal adnexa and no mass, fullness, tenderness

## 2024-03-02 ENCOUNTER — NURSE TRIAGE (OUTPATIENT)
Dept: OTHER | Facility: OTHER | Age: 64
End: 2024-03-02

## 2024-03-02 NOTE — TELEPHONE ENCOUNTER
"Answer Assessment - Initial Assessment Questions  1. STOOL PATTERN OR FREQUENCY: \"How often do you pass bowel movements (BMs)?\"  (Normal range: tid to q 3 days)  \"When was the last BM passed?\"        At least once daily    2. STRAINING: \"Do you have to strain to have a BM?\"       Yes    3. RECTAL PAIN: \"Does your rectum hurt when the stool comes out?\" If Yes, ask: \"Do you have hemorrhoids? How bad is the pain?\"  (Scale 1-10; or mild, moderate, severe)      10/10    4. STOOL COMPOSITION: \"Are the stools hard?\"       Some are hard, but some are softer    5. BLOOD ON STOOLS: \"Has there been any blood on the toilet tissue or on the surface of the BM?\" If Yes, ask: \"When was the last time?\"       Denies    6. CHRONIC CONSTIPATION: \"Is this a new problem for you?\"  If no, ask: \"How long have you had this problem?\" (days, weeks, months)       Intermittent    7. CHANGES IN DIET OR HYDRATION: \"Have there been any recent changes in your diet?\" \"How much fluids are you drinking consuming on a daily basis?\"  \"How much have you had to drink today?\"      No changes to diet or hydration; drinks adeq    8. MEDICATIONS: \"Have you been taking any new medications?\" \"Are you taking any narcotic pain medications?\" (e.g., Vicoden, Percocet, morphine, dilaudid)      No    9. LAXATIVES: \"Have you been using any stool softeners, laxatives, or enemas?\"  If yes, ask \"What, how often, and when was the last time?\"      Colace, dulcolax    10.ACTIVITY:  \"How much walking do you do every day? on a daily basis?\"  \"Has your activity level decreased in the past week?\"         Per patient, is usually pretty active    11. CAUSE: \"What do you think is causing the constipation?\"         Unsure    12. OTHER SYMPTOMS: \"Do you have any other symptoms?\" (e.g., abdominal pain, bloating, fever, vomiting)        Intermittent dizziness    13. MEDICAL HISTORY: \"Do you have a history of hemorrhoids, rectal fissures, or rectal surgery or rectal abscess?\"          " No    Protocols used: Constipation-ADULT-      Advised patient to drink plenty of water, ambulate, try enema vs suppository, miralax BID, and mag citrate that are available OTC. Patient should also discuss with her other doctors with [facility] in regards to recent hospitalization. Patient verbalized understanding.    Please follow up with patient on Monday 3/4.

## 2024-03-02 NOTE — TELEPHONE ENCOUNTER
"Regarding: dizzy/ constipation  ----- Message from Constanza Rojas sent at 3/2/2024  8:36 AM EST -----  \" Ina been constipated since last Saturday and I've been really dizzy from the constipation \"    "

## 2024-03-06 ENCOUNTER — ANESTHESIA EVENT (OUTPATIENT)
Dept: ANESTHESIOLOGY | Facility: HOSPITAL | Age: 64
End: 2024-03-06

## 2024-03-06 ENCOUNTER — APPOINTMENT (OUTPATIENT)
Dept: LAB | Facility: MEDICAL CENTER | Age: 64
End: 2024-03-06
Payer: COMMERCIAL

## 2024-03-06 ENCOUNTER — OFFICE VISIT (OUTPATIENT)
Dept: GASTROENTEROLOGY | Facility: CLINIC | Age: 64
End: 2024-03-06
Payer: COMMERCIAL

## 2024-03-06 ENCOUNTER — ANESTHESIA (OUTPATIENT)
Dept: ANESTHESIOLOGY | Facility: HOSPITAL | Age: 64
End: 2024-03-06

## 2024-03-06 ENCOUNTER — TELEPHONE (OUTPATIENT)
Dept: GASTROENTEROLOGY | Facility: CLINIC | Age: 64
End: 2024-03-06

## 2024-03-06 VITALS
TEMPERATURE: 99.4 F | WEIGHT: 129 LBS | HEIGHT: 65 IN | SYSTOLIC BLOOD PRESSURE: 124 MMHG | DIASTOLIC BLOOD PRESSURE: 80 MMHG | BODY MASS INDEX: 21.49 KG/M2

## 2024-03-06 DIAGNOSIS — R19.4 CHANGE IN BOWEL HABITS: Primary | ICD-10-CM

## 2024-03-06 DIAGNOSIS — R19.4 CHANGE IN BOWEL HABITS: ICD-10-CM

## 2024-03-06 LAB
T4 FREE SERPL-MCNC: 0.99 NG/DL (ref 0.61–1.12)
TSH SERPL DL<=0.05 MIU/L-ACNC: <0.01 UIU/ML (ref 0.45–4.5)

## 2024-03-06 PROCEDURE — 36415 COLL VENOUS BLD VENIPUNCTURE: CPT

## 2024-03-06 PROCEDURE — 99204 OFFICE O/P NEW MOD 45 MIN: CPT | Performed by: PHYSICIAN ASSISTANT

## 2024-03-06 PROCEDURE — 84439 ASSAY OF FREE THYROXINE: CPT

## 2024-03-06 PROCEDURE — 84443 ASSAY THYROID STIM HORMONE: CPT

## 2024-03-06 RX ORDER — METHYLPREDNISOLONE 4 MG/1
4 TABLET ORAL
COMMUNITY
Start: 2024-03-01 | End: 2024-03-11 | Stop reason: ALTCHOICE

## 2024-03-06 RX ORDER — METHOCARBAMOL 750 MG/1
750 TABLET, FILM COATED ORAL
COMMUNITY

## 2024-03-06 RX ORDER — POLYETHYLENE GLYCOL 3350 17 G/17G
17 POWDER, FOR SOLUTION ORAL DAILY
Qty: 100 EACH | Refills: 2 | Status: SHIPPED | OUTPATIENT
Start: 2024-03-06 | End: 2024-03-11 | Stop reason: ALTCHOICE

## 2024-03-06 NOTE — PATIENT INSTRUCTIONS
Make sure you keep drinking at least 64 ounces of water/day  Start using miralax daily-twice/day for about 5 days: if you feel this is not helpful, please give me a call   The best way to take linzess is inn the morning, 30-60 minutes before breakfast. Please separate your  thyroid medicine within 1 hour      Scheduled date of colonoscopy (as of today):03.11.24  Physician performing colonoscopy:DR BLUM  Location of colonoscopy:[facility]  Bowel prep reviewed with patient:MARCO  Instructions reviewed with patient by:CRYSTAL  Clearances: N/A

## 2024-03-06 NOTE — PROGRESS NOTES
[facility] - Outpatient Consultation  Tatyana Davis 64 y.o. female MRN: [id number]  Encounter: [id number]          ASSESSMENT AND PLAN:      Tatyana is a 65 y/o female with hypothyroid, HTN, hx of kidney cancer s/p cryosurgery who presents for consultation of constipation.     Change in bowel habits  Rectal pain  3. Personal hx of colon polyps  Pt is tearful during this visit. Pt says that over the the last year or so, she has been constipated, which is new for her. She says that she was moving her bowels daily prior to that but since then, she has been skipping days without moving her bowels and even on the days she does move her bowels, only small, hard seng OR thin, long stools are evacuated. She says she has bene having 10/10 rectal pain over the last few weeks, as well, both with and without BM. Last colonoscopy was 11/2020, which noted 2 polyps, 1 of which was TA on pathology and 1 hyperplastic polyp. MAIRA deferred as she says she would like colonoscopy done ASAP, which I agree with.   -colonoscopy ordered to rule out obstructing rectal lesion or colonic mass, polyps, tortuous colon  -continue to drink gris least 64 ounces of water/day  -start miralax daily-BID x 5 days: if this does not help, please call our office and trial the linzess 145 mcg sample I provided   ______________________________________________________________________    HPI:  Tatyana is a 65 y/o female with hypothyroid, HTN, hx of kidney cancer s/p cryosurgery who presents for consultation of constipation. Pt says that over the the last year or so, she has been constipated, which is new for her. She says that she was moving her bowels daily prior to that but since then, she has been skipping days without moving her bowels and even on the days she does move her bowels, only small, hard seng OR thin, long stools are evacuated. She says she has bene having 10/10 rectal pain over the last few weeks, as well, both  with and without BM. She denies n/v, dysphagia, NSAID use, unintentional weight loss, fevers, chills, night sweats, known family hx of colon or rectal cancer, bloody or black BM.           REVIEW OF SYSTEMS:    CONSTITUTIONAL: Denies any fever, chills, rigors, and weight loss.  HEENT: No earache or tinnitus. Denies hearing loss or visual disturbances.  CARDIOVASCULAR: No chest pain or palpitations.   RESPIRATORY: Denies any cough, hemoptysis, shortness of breath or dyspnea on exertion.  GASTROINTESTINAL: As noted in the History of Present Illness.   GENITOURINARY: No problems with urination. Denies any hematuria or dysuria.  NEUROLOGIC: No dizziness or vertigo, denies headaches.   MUSCULOSKELETAL: Denies any muscle or joint pain.   SKIN: Denies skin rashes or itching.   ENDOCRINE: Denies excessive thirst. Denies intolerance to heat or cold.  PSYCHOSOCIAL: Denies depression or anxiety. Denies any recent memory loss.       Historical Information   Past Medical History:   Diagnosis Date    Anxiety     Basal cell carcinoma (BCC)     shoulder    Cancer (HCC)     Right    Chronic kidney disease     Colon polyp     Disease of thyroid gland     Skin cancer of chest, excluding breast      Past Surgical History:   Procedure Laterality Date    BREAST EXCISIONAL BIOPSY Right     age 28-lymph nodes-benign    COLONOSCOPY      HYSTERECTOMY  1993    RENAL CRYOABLATION Right 08/30/2016     Social History   Social History     Substance and Sexual Activity   Alcohol Use No     Social History     Substance and Sexual Activity   Drug Use No     Social History     Tobacco Use   Smoking Status Never   Smokeless Tobacco Never     Family History   Problem Relation Age of Onset    Kidney disease Mother 32    Lung cancer Father 76    Anxiety disorder Sister     No Known Problems Daughter     No Known Problems Paternal Aunt     Anxiety disorder Sister     Anxiety disorder Sister     No Known Problems Daughter     Anxiety disorder Brother         Meds/Allergies       Current Outpatient Medications:     Ascorbic Acid (VITAMIN C PO)    atorvastatin (LIPITOR) 10 mg tablet    buPROPion (WELLBUTRIN XL) 150 mg 24 hr tablet    cyclobenzaprine (FLEXERIL) 10 mg tablet    DULoxetine (CYMBALTA) 60 mg delayed release capsule    estradiol (ESTRACE VAGINAL) 0.1 mg/g vaginal cream    fluorouracil (EFUDEX) 5 % cream    gabapentin (NEURONTIN) 400 mg capsule    losartan (COZAAR) 25 mg tablet    Magnesium 100 MG TABS    POTASSIUM CHLORIDE ER PO    Tirosint 125 MCG CAPS    traZODone (DESYREL) 100 mg tablet    vitamin E, tocopherol, 200 units capsule    No Known Allergies        Objective     There were no vitals taken for this visit. There is no height or weight on file to calculate BMI.        PHYSICAL EXAM:      General Appearance:   Alert, cooperative, no distress   HEENT:   Normocephalic, atraumatic, anicteric.     Neck:  Supple, symmetrical, trachea midline   Lungs:   Clear to auscultation bilaterally; no rales, rhonchi or wheezing; respirations unlabored    Heart::   Regular rate and rhythm; no murmur, rub, or gallop.   Abdomen:   Soft, non-tender, non-distended; normal bowel sounds; no masses, no organomegaly    Genitalia:   Deferred    Rectal:   Deferred    Extremities:  No cyanosis, clubbing or edema    Pulses:  2+ and symmetric    Skin:  No jaundice, rashes, or lesions    Lymph nodes:  No palpable cervical lymphadenopathy        Lab Results:   No visits with results within 1 Day(s) from this visit.   Latest known visit with results is:   Appointment on 10/04/2023   Component Date Value    Sodium 10/04/2023 137     Potassium 10/04/2023 4.3     Chloride 10/04/2023 101     CO2 10/04/2023 25     ANION GAP 10/04/2023 11     BUN 10/04/2023 10     Creatinine 10/04/2023 0.62     Glucose, Fasting 10/04/2023 86     Calcium 10/04/2023 9.8     AST 10/04/2023 55 (H)     ALT 10/04/2023 70 (H)     Alkaline Phosphatase 10/04/2023 65     Total Protein 10/04/2023 7.6     Albumin  10/04/2023 4.5     Total Bilirubin 10/04/2023 0.30     eGFR 10/04/2023 96     Hemoglobin A1C 10/04/2023 5.9 (H)     EAG 10/04/2023 123     WBC 10/04/2023 6.11     RBC 10/04/2023 4.31     Hemoglobin 10/04/2023 14.7     Hematocrit 10/04/2023 43.9     MCV 10/04/2023 102 (H)     MCH 10/04/2023 34.1     MCHC 10/04/2023 33.5     RDW 10/04/2023 12.8     MPV 10/04/2023 9.4     Platelets 10/04/2023 348     nRBC 10/04/2023 0     Neutrophils Relative 10/04/2023 57     Immat GRANS % 10/04/2023 1     Lymphocytes Relative 10/04/2023 23     Monocytes Relative 10/04/2023 16 (H)     Eosinophils Relative 10/04/2023 2     Basophils Relative 10/04/2023 1     Neutrophils Absolute 10/04/2023 3.52     Immature Grans Absolute 10/04/2023 0.03     Lymphocytes Absolute 10/04/2023 1.43     Monocytes Absolute 10/04/2023 0.98     Eosinophils Absolute 10/04/2023 0.10     Basophils Absolute 10/04/2023 0.05          Radiology Results:   XR spine lumbar minimum 4 views non injury    Result Date: 3/1/2024  Narrative: Clinical   history is low back pain. 5 views of the lumbar spine. There is diffuse mild spurring of lumbar vertebral bodies. There is no evidence of fracture, pedicles are intact. There is no evidence of spondylolysis or spondylolisthesis. There is degenerative change of the posterior elements of the low lumbar spine with sclerosis.    Impression: Impression: Degenerative change, no fracture. Workstation:[id number]    XR sacrum and coccyx    Result Date: 3/1/2024  Narrative: Clinical history is pain. 3 views of the sacrum and coccyx. There is no evidence of fracture or dislocation. Sacral foramina appear intact. There is no evidence of sacroiliitis. There is mild spurring of the symphysis pubis. There are calcified phleboliths in the pelvis.    Impression: Impression: No fracture. Workstation:[id number]    VAS TRANSCRANIAL DOPPLER BILATERAL/COMPLETE    Result Date: 2/26/2024  Narrative: Study:  Transcranial Doppler. Reason for Study:   Subarachnoid hemorrhage. Technical Quality:  Limited study; follow up for vasospasm. Description:  Transcranial Doppler sonography is obtained with insonation of the middle cerebral, anterior cerebral, posterior cerebral, and distal internal carotid via trans temporal bone window.   Interpretation:  Middle cerebral mean flow velocities for the M1 segment are 22 cm/sec left and 51 cm/sec right.  The A1 segment of the anterior cerebral shows  mean flow velocities of 42 cm/sec left and 49 cm/sec right.  The P1 segments of the posterior cerebral arteries show mean flow velocities of 10 cm/sec left and 21 cm/sec right.  The terminal intracranial internal carotid arteries have mean flow velocities of 19 cm/sec left and 52 cm/sec right.  The extracranial internal carotid artery mean flow velocities are recorded at 26 cm/sec on the left and 29 cm/sec on the right.  The extracranial ICA to Intracranial ICA/MCA ratio is 0.83 on the left and 1.80 on the right .    Impression: Impression: No spasm. Workstation:[email or web address]    CTA head w wo contrast    Result Date: 2/24/2024  Narrative: CT head without contrast CT angiogram head CLINICAL INFORMATION: Subarachnoid hemorrhage. TECHNIQUE: Unenhanced CT head and CT angiogram of the head were performed. Multiplanar and 3-D/MIP reformats were made. 80 mL of Omnipaque 350 was administered. COMPARISON: 02/18/2024. FINDINGS: Brain parenchyma: No acute major vascular territory infarct. Hypodensities in the cerebral white matter, likely mild small vessel disease. Ventricular system, basal cisterns, and extra-axial spaces: Mildly prominent. Mild brain parenchymal volume loss. Intracranial hemorrhage: Decreased perimesencephalic and prepontine subarachnoid hemorrhage. Midline shift: None. Calvarium and skull base: Normal. Orbits: Bilateral lens placement. Paranasal sinuses: Clear. Mastoid air cells: Clear. Sella: Normal. CTA HEAD: Bilateral ICAs, ACAs, and MCAs are patent. Bilateral PCAs are  patent. The basilar artery and bilateral vertebral arteries are patent. 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 No definite saccular aneurysm identified.    Impression: IMPRESSION: 1.  Decreased perimesencephalic and prepontine subarachnoid hemorrhage. No acute major vascular territory infarct. 2.  Patent major intracranial arteries. No significant stenosis. 3.  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 No definite saccular aneurysm identified. Continued follow-up is recommended. Workstation:[id number]    VAS TRANSCRANIAL DOPPLER BILATERAL/COMPLETE    Result Date: 2/23/2024  Narrative: Study:  Transcranial Doppler. Reason for Study:  Subarachnoid hemorrhage. Technical Quality:  Limited study; follow up for vasospasm. Study is limited by the poor left temporal window Description:  Transcranial Doppler sonography is obtained with insonation of the middle cerebral, anterior cerebral, posterior cerebral, and distal internal carotid via trans temporal bone window.   Interpretation:  Middle cerebral mean flow velocities for the M1 segment are 80 cm/sec left and 44 cm/sec right.  The A1 segment of the anterior cerebral shows mean flow velocities of 47 cm/sec left and 44 cm/sec right.  The P1 segments of the posterior cerebral arteries show mean flow velocities of 10 cm/sec left and 10 cm/sec right.  The terminal intracranial internal carotid arteries have mean flow velocities of 23 cm/sec left and 47 cm/sec right.  The extracranial internal carotid artery mean flow velocities are recorded at 20 cm/sec on the left and 95 cm/sec on the right.  The extracranial ICA to Intracranial ICA/MCA ratio is less than 2 bilaterally .    Impression: Impression: No evidence of vasospasm on this study. Workstation:[id number]    VAS TRANSCRANIAL DOPPLER BILATERAL/COMPLETE    Result Date: 2/23/2024  Narrative: Study:  Transcranial Doppler. Reason for Study:  Subarachnoid hemorrhage. Technical Quality:  Limited study; follow  up for vasospasm. Suboptimal left temporal window Description:  Transcranial Doppler sonography is obtained with insonation of the middle cerebral, anterior cerebral, posterior cerebral, and distal internal carotid via trans temporal bone window.  Suboptimal left temporal window Interpretation:  Middle cerebral mean flow velocities for the M1 segment are not well visualized on the left, mean flow velocities 27 cm/s and 52 cm/sec right. The A1 segment of the anterior cerebral shows mean flow velocities of 45 cm/sec left and 39 cm/sec right.  The P1 segments of the posterior cerebral arteries show mean flow velocities of 14 cm/sec left and 24 cm/sec right.  The terminal intracranial internal carotid arteries have mean flow velocities of 23 cm/sec left and 53 cm/sec right.  The extracranial internal carotid artery mean flow velocities are recorded at 24 cm/sec on the left and 23 cm/sec on the right. The extracranial ICA to Intracranial ICA/MCA ratio is 1.1 on the left and 2.2 on the right .    Impression: Impression: No evidence of vasospasm on this study. Workstation:[id number]    XR foot 2 vw left    Result Date: 2/23/2024  Narrative: History: Medial pain without trauma. AP and lateral views of the left foot were obtained. There is no evidence of fracture or dislocation. There are no erosive or degenerative changes.    Impression: IMPRESSION: Normal left foot. Workstation:[id number]    ECHO 2D COMPLETE    Result Date: 2/22/2024  Narrative: This result has an attachment that is not available.   Left Ventricle: Left ventricle is normal in size. Wall thickness is normal. Systolic function is normal with an ejection fraction of 55-60%. Wall motion is within normal limits. There is normal diastolic function.   Right Ventricle: Right ventricle cavity is normal. Systolic function is normal.   No significant valve dysfunction. Left Ventricle Left ventricle is normal in size. Wall thickness is normal. Systolic function is normal  with an ejection fraction of 55-60%. Wall motion is within normal limits. There is normal diastolic function. Right Ventricle Right ventricle cavity is normal. Systolic function is normal. Left Atrium Left atrium cavity size is normal. Right Atrium Right atrium cavity is normal. IVC/SVC IVC not well visualized. Mitral Valve Mitral valve structure is normal. There is trace regurgitation. There is no evidence of mitral valve stenosis. Tricuspid Valve Tricuspid valve structure is normal. There is mild regurgitation. There is no evidence of tricuspid valve stenosis. Aortic Valve The aortic valve is trileaflet. There is no regurgitation or stenosis. Pulmonic Valve The pulmonic valve was not well visualized. There is no regurgitation or stenosis. PA pressure not calculated due to incomplete TR signal. Ascending Aorta The aorta appears normal in size. Pericardium There is no pericardial effusion. Study Details A complete 2D echocardiogram was performed. Color flow, Pulse Wave and Continuous Wave Doppler was performed and analyzed.    VAS TRANSCRANIAL DOPPLER BILATERAL/COMPLETE    Result Date: 2/21/2024  Narrative: Study:  Transcranial Doppler. Reason for Study:  Subarachnoid hemorrhage. Technical Quality:  Limited study; follow up for vasospasm. Description:  Transcranial Doppler sonography is obtained with insonation of the middle cerebral, anterior cerebral, posterior cerebral, and distal internal carotid via trans temporal bone window.   Interpretation:  Middle cerebral mean flow velocities for the M1 segment are 17 cm/sec left and 50 cm/sec right.  The A1 segment of the anterior cerebral shows  mean flow velocities of 47 cm/sec left and 39 cm/sec right.  The P1 segments of the posterior cerebral arteries show mean flow velocities of 14 cm/sec left and 38 cm/sec right.  The terminal intracranial internal carotid arteries have mean flow velocities of 70 cm/sec left and 44 cm/sec right.  The extracranial internal carotid  artery mean flow velocities are recorded at 29 cm/sec on the left and 27 cm/sec on the right.  The extracranial ICA to Intracranial ICA/MCA ratio is 0.82 on the left and 1.85 on the right .    Impression: Impression: No spasm. Workstation:[id number]    VAS TRANSCRANIAL DOPPLER BILATERAL/COMPLETE    Result Date: 2/21/2024  Narrative: Study:  Transcranial Doppler. Reason for Study:  Subarachnoid hemorrhage. Technical Quality:  Limited study; follow up for vasospasm. Description:  Transcranial Doppler sonography is obtained with insonation of the middle cerebral, anterior cerebral, posterior cerebral, and distal internal carotid via trans temporal bone window.   Interpretation:  Middle cerebral mean flow velocities for the M1 segment are 24 cm/sec left with poor visualized waveforms and 38 cm/sec right.  The A1 segment  of the anterior cerebral shows mean flow velocities of 55 cm/sec left and 38 cm/sec right.  The P1 segments of the posterior cerebral arteries show mean flow velocities of 16 cm/sec left and 23 cm/sec right.  The terminal intracranial internal carotid arteries have mean flow velocities of 22 cm/sec left and 44 cm/sec right.  The extracranial internal carotid artery mean flow velocities are recorded at 22 cm/sec on the left and 26 cm/sec on the right.  The extracranial ICA to Intracranial ICA/MCA ratio is 1 on the left and 1.5 on the right .    Impression: Impression: No evidence of vasospasm on this study. Workstation:[id number]    IR ANGIOGRAM VERTEBRAL BILATERAL    Result Date: 2/20/2024  Narrative: PROCEDURE: Cerebral Arteriogram   DATE OF PROCEDURE:2/19/2024 HISTORY: Subarachnoid hemorrhage. Suspect perimesencephalic. COMPARISON: CT angiogram 2/18/2024     EXAMINATION DETAILS: Estimated Blood Loss (mL): <5 Fluoroscopy time (minutes): 10.1 CAK Dose (mGy): 345.64/105.05 VESSELS SELECTED: Right radial artery, right common carotid artery, right vertebral artery, left common carotid artery, left vertebral  artery. COMPLICATIONS: None. CONTRAST:  103 mL of Visipaque 320 given intra-arterially. Nonionic contrast was utilized for the patient's safety. ANESTHESIA: Local anesthesia with 1% lidocaine. [Moderate Conscious Sedation.] Moderate sedation was performed with independent observation by the radiology nurse TECHNIQUE: Prior to the procedure, appropriate time out was done to identify the patient and the procedure. Consent: The procedure, its risks, benefits, and alternatives were discussed in detail with the patient/patient's representative. Risks include but are not limited to: bleeding, infection, nerve injury, vessel injury, groin hematoma, pain, radiation exposure, alopecia, renal toxicity, renal failure, and allergic reaction to contrast, stroke, blindness, coma, paralysis, death, and the need for additional treatments.  All questions were answered and no guarantees were provided. Access: After informed consent the patient was brought into the angiography suite and placed supine on the angiographic table. The right wrist was prepped and draped using sterile technique. The skin was prepped using ChloraPrep, and allowed to dry before sterile draping applied in the usual sterile fashion. Maximum sterile barrier technique was used, including the use of a cap, mask, sterile gown, gloves, and full body drape, after recommended hand hygiene and aseptic techniques. The skin overlying the proximal right radial artery was locally anesthetized with lidocaine (1%). Ultrasound guidance was used to evaluate the right radial access site and patency of the right radial artery was noted and documented in PACS. Using a standard radial access kit, the micropuncture needle was advanced into the right radial artery under real-time ultrasound guidance; intravascular location of the needle tip was confirmed on ultrasound. A 5-Chinese sheath was placed. A cocktail of 5 mg of verapamil, 200 mcg of nitroglycerin, and 3000 units of heparin  "was administered through the sheath. Using a an 0.035\" Glidewire and a 5-[place]ian SIM2 glide catheter, the above vessels were selected for angiography. FINDINGS: RIGHT RADIAL ARTERY INJECTION, AP VIEW: Right radial and brachial arteries are angiographically unremarkable. LEFT VERTEBRAL ARTERY INJECTION, HEAD VIEWS: Left subclavian artery injection with the blood pressure cuff elevated demonstrates unremarkable radiographic appearance of the distal left cervical and intradural vertebral arteries. The basilar artery and its associated cerebellar branches are angiographically unremarkable noting dominant left PICA. Posterior cerebral arteries are angiographically unremarkable. The angiogram has normal parenchymal and venous phases. There is no evidence of hemodynamically significant intracranial arterial stenosis, vascular malformation, or intracranial aneurysm. LEFT COMMON CAROTID ARTERY INJECTION, HEAD VIEWS: The left external carotid artery is unremarkable. The left distal cervical, petrous, cavernous, supraclinoid segments of the left internal carotid artery are angiographically unremarkable. The left anterior and middle cerebral arteries are angiographically unremarkable. The angiogram is normal parenchymal and venous phases. There is no evidence of hemodynamically significant intracranial arterial stenosis, vascular malformation, or intracranial aneurysm. RIGHT COMMON CAROTID ARTERY INJECTION, HEAD VIEWS: The right distal cervical, petrous, cavernous, and supraclinoid segments of the right internal carotid artery are angiographically unremarkable. There is a short right M1 segment. The right anterior and middle cerebral arteries are angiographically unremarkable otherwise. The right external carotid artery is unremarkable. The angiogram has normal parenchymal and venous phases. There is no evidence of hemodynamically significant intracranial arterial stenosis, vascular malformation, or intracranial aneurysm. RIGHT " VERTEBRAL ARTERY INJECTION, HEAD VIEWS: The right distal cervical and intradural vertebral arteries are angiographically unremarkable. The basilar artery and its associated cerebellar branches are unremarkable. Posterior cerebral arteries are angiographically unremarkable. The angiogram is normal parenchymal and venous phases. There is no evidence of hemodynamically significant intracranial arterial stenosis, vascular malformation, or intracranial aneurysm. At the completion of the procedure, the catheter and sheath were removed and a TR band was placed. No new neurological deficits or complications were encountered during or immediately following the procedure.    Impression: IMPRESSION: No evidence of hemodynamically significant intracranial arterial stenosis, vascular malformation, or intracranial aneurysm. Judah Yang MD Diagnostic & Interventional Neuroradiologist Workstation:[id number]    IR ANGIOGRAM CAROTID CERVICAL BILATERAL    Result Date: 2/20/2024  Narrative: PROCEDURE: Cerebral Arteriogram   DATE OF PROCEDURE:2/19/2024 HISTORY: Subarachnoid hemorrhage. Suspect perimesencephalic. COMPARISON: CT angiogram 2/18/2024     EXAMINATION DETAILS: Estimated Blood Loss (mL): <5 Fluoroscopy time (minutes): 10.1 CAK Dose (mGy): 345.64/105.05 VESSELS SELECTED: Right radial artery, right common carotid artery, right vertebral artery, left common carotid artery, left vertebral artery. COMPLICATIONS: None. CONTRAST:  103 mL of Visipaque 320 given intra-arterially. Nonionic contrast was utilized for the patient's safety. ANESTHESIA: Local anesthesia with 1% lidocaine. [Moderate Conscious Sedation.] Moderate sedation was performed with independent observation by the radiology nurse TECHNIQUE: Prior to the procedure, appropriate time out was done to identify the patient and the procedure. Consent: The procedure, its risks, benefits, and alternatives were discussed in detail with the patient/patient's representative. Risks  "include but are not limited to: bleeding, infection, nerve injury, vessel injury, groin hematoma, pain, radiation exposure, alopecia, renal toxicity, renal failure, and allergic reaction to contrast, stroke, blindness, coma, paralysis, death, and the need for additional treatments.  All questions were answered and no guarantees were provided. Access: After informed consent the patient was brought into the angiography suite and placed supine on the angiographic table. The right wrist was prepped and draped using sterile technique. The skin was prepped using ChloraPrep, and allowed to dry before sterile draping applied in the usual sterile fashion. Maximum sterile barrier technique was used, including the use of a cap, mask, sterile gown, gloves, and full body drape, after recommended hand hygiene and aseptic techniques. The skin overlying the proximal right radial artery was locally anesthetized with lidocaine (1%). Ultrasound guidance was used to evaluate the right radial access site and patency of the right radial artery was noted and documented in PACS. Using a standard radial access kit, the micropuncture needle was advanced into the right radial artery under real-time ultrasound guidance; intravascular location of the needle tip was confirmed on ultrasound. A 5-Frisian sheath was placed. A cocktail of 5 mg of verapamil, 200 mcg of nitroglycerin, and 3000 units of heparin was administered through the sheath. Using a an 0.035\" Glidewire and a 5-Frisian SIM2 glide catheter, the above vessels were selected for angiography. FINDINGS: RIGHT RADIAL ARTERY INJECTION, AP VIEW: Right radial and brachial arteries are angiographically unremarkable. LEFT VERTEBRAL ARTERY INJECTION, HEAD VIEWS: Left subclavian artery injection with the blood pressure cuff elevated demonstrates unremarkable radiographic appearance of the distal left cervical and intradural vertebral arteries. The basilar artery and its associated cerebellar " branches are angiographically unremarkable noting dominant left PICA. Posterior cerebral arteries are angiographically unremarkable. The angiogram has normal parenchymal and venous phases. There is no evidence of hemodynamically significant intracranial arterial stenosis, vascular malformation, or intracranial aneurysm. LEFT COMMON CAROTID ARTERY INJECTION, HEAD VIEWS: The left external carotid artery is unremarkable. The left distal cervical, petrous, cavernous, supraclinoid segments of the left internal carotid artery are angiographically unremarkable. The left anterior and middle cerebral arteries are angiographically unremarkable. The angiogram is normal parenchymal and venous phases. There is no evidence of hemodynamically significant intracranial arterial stenosis, vascular malformation, or intracranial aneurysm. RIGHT COMMON CAROTID ARTERY INJECTION, HEAD VIEWS: The right distal cervical, petrous, cavernous, and supraclinoid segments of the right internal carotid artery are angiographically unremarkable. There is a short right M1 segment. The right anterior and middle cerebral arteries are angiographically unremarkable otherwise. The right external carotid artery is unremarkable. The angiogram has normal parenchymal and venous phases. There is no evidence of hemodynamically significant intracranial arterial stenosis, vascular malformation, or intracranial aneurysm. RIGHT VERTEBRAL ARTERY INJECTION, HEAD VIEWS: The right distal cervical and intradural vertebral arteries are angiographically unremarkable. The basilar artery and its associated cerebellar branches are unremarkable. Posterior cerebral arteries are angiographically unremarkable. The angiogram is normal parenchymal and venous phases. There is no evidence of hemodynamically significant intracranial arterial stenosis, vascular malformation, or intracranial aneurysm. At the completion of the procedure, the catheter and sheath were removed and a TR band  was placed. No new neurological deficits or complications were encountered during or immediately following the procedure.    Impression: IMPRESSION: No evidence of hemodynamically significant intracranial arterial stenosis, vascular malformation, or intracranial aneurysm. Judah Yang MD Diagnostic & Interventional Neuroradiologist Workstation:[id number]    IR ANGIOGRAM UPPER EXTREMITY RIGHT    Result Date: 2/20/2024  Narrative: PROCEDURE: Cerebral Arteriogram   DATE OF PROCEDURE:2/19/2024 HISTORY: Subarachnoid hemorrhage. Suspect perimesencephalic. COMPARISON: CT angiogram 2/18/2024     EXAMINATION DETAILS: Estimated Blood Loss (mL): <5 Fluoroscopy time (minutes): 10.1 CAK Dose (mGy): 345.64/105.05 VESSELS SELECTED: Right radial artery, right common carotid artery, right vertebral artery, left common carotid artery, left vertebral artery. COMPLICATIONS: None. CONTRAST:  103 mL of Visipaque 320 given intra-arterially. Nonionic contrast was utilized for the patient's safety. ANESTHESIA: Local anesthesia with 1% lidocaine. [Moderate Conscious Sedation.] Moderate sedation was performed with independent observation by the radiology nurse TECHNIQUE: Prior to the procedure, appropriate time out was done to identify the patient and the procedure. Consent: The procedure, its risks, benefits, and alternatives were discussed in detail with the patient/patient's representative. Risks include but are not limited to: bleeding, infection, nerve injury, vessel injury, groin hematoma, pain, radiation exposure, alopecia, renal toxicity, renal failure, and allergic reaction to contrast, stroke, blindness, coma, paralysis, death, and the need for additional treatments.  All questions were answered and no guarantees were provided. Access: After informed consent the patient was brought into the angiography suite and placed supine on the angiographic table. The right wrist was prepped and draped using sterile technique. The skin was  "prepped using ChloraPrep, and allowed to dry before sterile draping applied in the usual sterile fashion. Maximum sterile barrier technique was used, including the use of a cap, mask, sterile gown, gloves, and full body drape, after recommended hand hygiene and aseptic techniques. The skin overlying the proximal right radial artery was locally anesthetized with lidocaine (1%). Ultrasound guidance was used to evaluate the right radial access site and patency of the right radial artery was noted and documented in PACS. Using a standard radial access kit, the micropuncture needle was advanced into the right radial artery under real-time ultrasound guidance; intravascular location of the needle tip was confirmed on ultrasound. A 5-Kenyan sheath was placed. A cocktail of 5 mg of verapamil, 200 mcg of nitroglycerin, and 3000 units of heparin was administered through the sheath. Using a an 0.035\" Glidewire and a 5-Kenyan SIM2 glide catheter, the above vessels were selected for angiography. FINDINGS: RIGHT RADIAL ARTERY INJECTION, AP VIEW: Right radial and brachial arteries are angiographically unremarkable. LEFT VERTEBRAL ARTERY INJECTION, HEAD VIEWS: Left subclavian artery injection with the blood pressure cuff elevated demonstrates unremarkable radiographic appearance of the distal left cervical and intradural vertebral arteries. The basilar artery and its associated cerebellar branches are angiographically unremarkable noting dominant left PICA. Posterior cerebral arteries are angiographically unremarkable. The angiogram has normal parenchymal and venous phases. There is no evidence of hemodynamically significant intracranial arterial stenosis, vascular malformation, or intracranial aneurysm. LEFT COMMON CAROTID ARTERY INJECTION, HEAD VIEWS: The left external carotid artery is unremarkable. The left distal cervical, petrous, cavernous, supraclinoid segments of the left internal carotid artery are angiographically " unremarkable. The left anterior and middle cerebral arteries are angiographically unremarkable. The angiogram is normal parenchymal and venous phases. There is no evidence of hemodynamically significant intracranial arterial stenosis, vascular malformation, or intracranial aneurysm. RIGHT COMMON CAROTID ARTERY INJECTION, HEAD VIEWS: The right distal cervical, petrous, cavernous, and supraclinoid segments of the right internal carotid artery are angiographically unremarkable. There is a short right M1 segment. The right anterior and middle cerebral arteries are angiographically unremarkable otherwise. The right external carotid artery is unremarkable. The angiogram has normal parenchymal and venous phases. There is no evidence of hemodynamically significant intracranial arterial stenosis, vascular malformation, or intracranial aneurysm. RIGHT VERTEBRAL ARTERY INJECTION, HEAD VIEWS: The right distal cervical and intradural vertebral arteries are angiographically unremarkable. The basilar artery and its associated cerebellar branches are unremarkable. Posterior cerebral arteries are angiographically unremarkable. The angiogram is normal parenchymal and venous phases. There is no evidence of hemodynamically significant intracranial arterial stenosis, vascular malformation, or intracranial aneurysm. At the completion of the procedure, the catheter and sheath were removed and a TR band was placed. No new neurological deficits or complications were encountered during or immediately following the procedure.    Impression: IMPRESSION: No evidence of hemodynamically significant intracranial arterial stenosis, vascular malformation, or intracranial aneurysm. Judah Yang MD Diagnostic & Interventional Neuroradiologist Workstation:[id number]    IR ULTRASOUND GUIDE VASCULAR ACCESS    Result Date: 2/20/2024  Narrative: This result has an attachment that is not available. PROCEDURE: Cerebral Arteriogram   DATE OF  PROCEDURE:2/19/2024 HISTORY: Subarachnoid hemorrhage. Suspect perimesencephalic. COMPARISON: CT angiogram 2/18/2024     EXAMINATION DETAILS: Estimated Blood Loss (mL): <5 Fluoroscopy time (minutes): 10.1 CAK Dose (mGy): 345.64/105.05 VESSELS SELECTED: Right radial artery, right common carotid artery, right vertebral artery, left common carotid artery, left vertebral artery. COMPLICATIONS: None. CONTRAST:  103 mL of Visipaque 320 given intra-arterially. Nonionic contrast was utilized for the patient's safety. ANESTHESIA: Local anesthesia with 1% lidocaine. [Moderate Conscious Sedation.] Moderate sedation was performed with independent observation by the radiology nurse TECHNIQUE: Prior to the procedure, appropriate time out was done to identify the patient and the procedure. Consent: The procedure, its risks, benefits, and alternatives were discussed in detail with the patient/patient's representative. Risks include but are not limited to: bleeding, infection, nerve injury, vessel injury, groin hematoma, pain, radiation exposure, alopecia, renal toxicity, renal failure, and allergic reaction to contrast, stroke, blindness, coma, paralysis, death, and the need for additional treatments.  All questions were answered and no guarantees were provided. Access: After informed consent the patient was brought into the angiography suite and placed supine on the angiographic table. The right wrist was prepped and draped using sterile technique. The skin was prepped using ChloraPrep, and allowed to dry before sterile draping applied in the usual sterile fashion. Maximum sterile barrier technique was used, including the use of a cap, mask, sterile gown, gloves, and full body drape, after recommended hand hygiene and aseptic techniques. The skin overlying the proximal right radial artery was locally anesthetized with lidocaine (1%). Ultrasound guidance was used to evaluate the right radial access site and patency of the right  "radial artery was noted and documented in PACS. Using a standard radial access kit, the micropuncture needle was advanced into the right radial artery under real-time ultrasound guidance; intravascular location of the needle tip was confirmed on ultrasound. A 5-Belarusian sheath was placed. A cocktail of 5 mg of verapamil, 200 mcg of nitroglycerin, and 3000 units of heparin was administered through the sheath. Using a an 0.035\" Glidewire and a 5-Belarusian SIM2 glide catheter, the above vessels were selected for angiography. FINDINGS: RIGHT RADIAL ARTERY INJECTION, AP VIEW: Right radial and brachial arteries are angiographically unremarkable. LEFT VERTEBRAL ARTERY INJECTION, HEAD VIEWS: Left subclavian artery injection with the blood pressure cuff elevated demonstrates unremarkable radiographic appearance of the distal left cervical and intradural vertebral arteries. The basilar artery and its associated cerebellar branches are angiographically unremarkable noting dominant left PICA. Posterior cerebral arteries are angiographically unremarkable. The angiogram has normal parenchymal and venous phases. There is no evidence of hemodynamically significant intracranial arterial stenosis, vascular malformation, or intracranial aneurysm. LEFT COMMON CAROTID ARTERY INJECTION, HEAD VIEWS: The left external carotid artery is unremarkable. The left distal cervical, petrous, cavernous, supraclinoid segments of the left internal carotid artery are angiographically unremarkable. The left anterior and middle cerebral arteries are angiographically unremarkable. The angiogram is normal parenchymal and venous phases. There is no evidence of hemodynamically significant intracranial arterial stenosis, vascular malformation, or intracranial aneurysm. RIGHT COMMON CAROTID ARTERY INJECTION, HEAD VIEWS: The right distal cervical, petrous, cavernous, and supraclinoid segments of the right internal carotid artery are angiographically unremarkable. " There is a short right M1 segment. The right anterior and middle cerebral arteries are angiographically unremarkable otherwise. The right external carotid artery is unremarkable. The angiogram has normal parenchymal and venous phases. There is no evidence of hemodynamically significant intracranial arterial stenosis, vascular malformation, or intracranial aneurysm. RIGHT VERTEBRAL ARTERY INJECTION, HEAD VIEWS: The right distal cervical and intradural vertebral arteries are angiographically unremarkable. The basilar artery and its associated cerebellar branches are unremarkable. Posterior cerebral arteries are angiographically unremarkable. The angiogram is normal parenchymal and venous phases. There is no evidence of hemodynamically significant intracranial arterial stenosis, vascular malformation, or intracranial aneurysm. At the completion of the procedure, the catheter and sheath were removed and a TR band was placed. No new neurological deficits or complications were encountered during or immediately following the procedure.    Impression: IMPRESSION: No evidence of hemodynamically significant intracranial arterial stenosis, vascular malformation, or intracranial aneurysm. Judah Yang MD Diagnostic & Interventional Neuroradiologist Workstation:[id number]    VAS TRANSCRANIAL DOPPLER BILATERAL/COMPLETE    Result Date: 2/19/2024  Narrative: Study:  Transcranial Doppler. Reason for Study:  Subarachnoid hemorrhage. Technical Quality:  Limited study; follow up for vasospasm. Description:  Transcranial Doppler sonography is obtained with insonation of the middle cerebral, anterior cerebral, posterior cerebral, and distal internal carotid via trans temporal bone window.   Interpretation:  Middle cerebral mean flow velocities for the M1 segment are 34 cm/sec left and 41 cm/sec right.  The A1 segment of the anterior cerebral shows  mean flow velocities of 47 cm/sec left and 33 cm/sec right.  The P1 segments of the  posterior cerebral arteries show mean flow velocities of 13 cm/sec left and 22 cm/sec right.  The terminal intracranial internal carotid arteries have mean flow velocities of 30 cm/sec left and 39 cm/sec right.  The extracranial internal carotid artery mean flow velocities are recorded at 32 cm/sec on the left and 21 cm/sec on the right.  The extracranial ICA to Intracranial ICA/MCA ratio is 1 on the left and 1.9 on the right .    Impression: Impression: There is no evidence of vasospasm on this study. Workstation:[id number]    CTA head w wo contrast    Result Date: 2/19/2024  Narrative: CTA OF THE HEAD HISTORY: Subarachnoid hemorrhage (SAH) TECHNIQUE: Axial sections were obtained from the foramen magnum through the vertex during high-rate administration of intravenous contrast material. Images were reformatted on a dedicated workstation, and included 3-dimensional imaging. 80 cc of Omnipaque 350 were administered intravenously. No prior neurovascular imaging of the head is available for comparison. FINDINGS: The intracranial internal carotid arteries are patent. Ophthalmic arteries are identified bilaterally, however the origins are poorly visualized due to slice thickness. Posterior communicating arteries are not definitively identified, and could be absent or hypoplastic. The carotid termini are patent. The A1 segments of the anterior cerebral arteries and the M1 segments of the middle cerebral arteries are patent. The M1 segment of the RIGHT middle cerebral artery is short. No intracranial aneurysm is identified, however note that aneurysms under 3 mm in size may not be adequately assessed with CT angiography.    Impression: IMPRESSION: No intracranial aneurysm is identified, however note that aneurysms under 3 mm in size may not be adequately assessed with CT angiography. Conventional cerebral angiogram has already been ordered by neurosurgery at the time of this dictation. CT examination performed with dose  lowering protocol in accordance with ALARA. Workstation:[id number]    CT head wo contrast    Result Date: 2/18/2024  Narrative: History: Headache CT examination of the brain was performed using the departmental technique which included axial cuts with sagittal coronal reformats. Findings called to Dr. ARMENTA, at approximately 9:38 PM There are no prior studies for comparison. Findings: There is subarachnoid hemorrhage mostly located in the interpeduncular cistern. Possibility of underlying aneurysm cannot be excluded. Further evaluation with CTA recommended. The ventricles, sulci and cisterns are age-appropriate. Visualized paranasal sinuses, mastoid air cells and middle ear cavities are patent.    Impression: Impression: Perimesencephalic subarachnoid hemorrhage. Further evaluation CTA recommended to exclude possible underlying aneurysm. Workstation:[id number]

## 2024-03-07 ENCOUNTER — TELEPHONE (OUTPATIENT)
Dept: GASTROENTEROLOGY | Facility: MEDICAL CENTER | Age: 64
End: 2024-03-07

## 2024-03-07 ENCOUNTER — DOCUMENTATION (OUTPATIENT)
Dept: GASTROENTEROLOGY | Facility: CLINIC | Age: 64
End: 2024-03-07

## 2024-03-07 NOTE — TELEPHONE ENCOUNTER
Left a message to confirm procedure for 3/11/24 and to make sure she had her prep instructions and to see if she had any questions.

## 2024-03-07 NOTE — PROGRESS NOTES
Samples given [employer] [address] [place] [address] lot# [id number] exp 7/24 1 box with 4 pills in it.

## 2024-03-11 ENCOUNTER — ANESTHESIA EVENT (OUTPATIENT)
Dept: GASTROENTEROLOGY | Facility: AMBULARY SURGERY CENTER | Age: 64
End: 2024-03-11

## 2024-03-11 ENCOUNTER — HOSPITAL ENCOUNTER (OUTPATIENT)
Dept: GASTROENTEROLOGY | Facility: AMBULARY SURGERY CENTER | Age: 64
Setting detail: OUTPATIENT SURGERY
Discharge: HOME | End: 2024-03-11
Payer: COMMERCIAL

## 2024-03-11 ENCOUNTER — ANESTHESIA (OUTPATIENT)
Dept: GASTROENTEROLOGY | Facility: AMBULARY SURGERY CENTER | Age: 64
End: 2024-03-11

## 2024-03-11 VITALS
SYSTOLIC BLOOD PRESSURE: 128 MMHG | TEMPERATURE: 99 F | OXYGEN SATURATION: 97 % | BODY MASS INDEX: 20.33 KG/M2 | HEART RATE: 99 BPM | WEIGHT: 122 LBS | DIASTOLIC BLOOD PRESSURE: 80 MMHG | HEIGHT: 65 IN | RESPIRATION RATE: 17 BRPM

## 2024-03-11 DIAGNOSIS — R19.4 CHANGE IN BOWEL HABITS: ICD-10-CM

## 2024-03-11 PROCEDURE — 88305 TISSUE EXAM BY PATHOLOGIST: CPT | Performed by: PATHOLOGY

## 2024-03-11 PROCEDURE — 45380 COLONOSCOPY AND BIOPSY: CPT | Performed by: INTERNAL MEDICINE

## 2024-03-11 RX ORDER — SODIUM CHLORIDE, SODIUM LACTATE, POTASSIUM CHLORIDE, CALCIUM CHLORIDE 600; 310; 30; 20 MG/100ML; MG/100ML; MG/100ML; MG/100ML
INJECTION, SOLUTION INTRAVENOUS CONTINUOUS PRN
Status: DISCONTINUED | OUTPATIENT
Start: 2024-03-11 | End: 2024-03-11

## 2024-03-11 RX ORDER — PROPOFOL 10 MG/ML
INJECTION, EMULSION INTRAVENOUS AS NEEDED
Status: DISCONTINUED | OUTPATIENT
Start: 2024-03-11 | End: 2024-03-11

## 2024-03-11 RX ADMIN — SODIUM CHLORIDE, SODIUM LACTATE, POTASSIUM CHLORIDE, AND CALCIUM CHLORIDE: .6; .31; .03; .02 INJECTION, SOLUTION INTRAVENOUS at 14:28

## 2024-03-11 RX ADMIN — PROPOFOL 150 MG: 10 INJECTION, EMULSION INTRAVENOUS at 14:32

## 2024-03-11 RX ADMIN — PROPOFOL 50 MG: 10 INJECTION, EMULSION INTRAVENOUS at 14:39

## 2024-03-11 RX ADMIN — PROPOFOL 50 MG: 10 INJECTION, EMULSION INTRAVENOUS at 14:37

## 2024-03-11 RX ADMIN — PROPOFOL 50 MG: 10 INJECTION, EMULSION INTRAVENOUS at 14:45

## 2024-03-11 RX ADMIN — PROPOFOL 50 MG: 10 INJECTION, EMULSION INTRAVENOUS at 14:51

## 2024-03-11 RX ADMIN — PROPOFOL 50 MG: 10 INJECTION, EMULSION INTRAVENOUS at 14:35

## 2024-03-11 RX ADMIN — PROPOFOL 50 MG: 10 INJECTION, EMULSION INTRAVENOUS at 14:48

## 2024-03-11 RX ADMIN — SODIUM CHLORIDE, SODIUM LACTATE, POTASSIUM CHLORIDE, AND CALCIUM CHLORIDE: .6; .31; .03; .02 INJECTION, SOLUTION INTRAVENOUS at 13:05

## 2024-03-11 RX ADMIN — PROPOFOL 50 MG: 10 INJECTION, EMULSION INTRAVENOUS at 14:42

## 2024-03-11 NOTE — H&P
"H&P EXAM - Outpatient Endoscopy   Tatyana Davis 64 y.o. female MRN: [id number]    [facility]   Encounter: [id number]        History and Physical - [facility]  Tatyana Davis 64 y.o. female MRN: [id number]                  HPI: Tatyana Davis is a 64 y.o. year old female who presents for colonoscopy for evaluation of rectal bleeding and constipation.       REVIEW OF SYSTEMS: Per the HPI, and otherwise unremarkable.    Historical Information   Past Medical History:   Diagnosis Date    Anxiety     Basal cell carcinoma (BCC)     shoulder    Cancer (HCC)     Right    Chronic kidney disease     Colon polyp     Disease of thyroid gland     Skin cancer of chest, excluding breast      Past Surgical History:   Procedure Laterality Date    BREAST EXCISIONAL BIOPSY Right     age 28-lymph nodes-benign    COLONOSCOPY      HYSTERECTOMY  1993    RENAL CRYOABLATION Right 08/30/2016     Social History   Social History     Substance and Sexual Activity   Alcohol Use No     Social History     Substance and Sexual Activity   Drug Use No     Social History     Tobacco Use   Smoking Status Never   Smokeless Tobacco Never     Family History   Problem Relation Age of Onset    Kidney disease Mother 32    Lung cancer Father 76    Anxiety disorder Sister     No Known Problems Daughter     No Known Problems Paternal Aunt     Anxiety disorder Sister     Anxiety disorder Sister     No Known Problems Daughter     Anxiety disorder Brother        Meds/Allergies     (Not in a hospital admission)      No Known Allergies    Objective     BP (!) 130/102   Pulse (!) 111   Temp (!) 97.2 °F (36.2 °C) (Temporal)   Resp 19   Ht 5' 5\" (1.651 m)   Wt 55.3 kg (122 lb)   SpO2 98%   BMI 20.30 kg/m²       PHYSICAL EXAM    Gen: NAD  CV: RRR  CHEST: Clear  ABD: soft, NT/ND  EXT: no edema      ASSESSMENT/PLAN:  This is a 64 y.o. year old female here for colonoscopy, and she is stable and optimized for her " procedure.

## 2024-03-11 NOTE — ANESTHESIA PREPROCEDURE EVALUATION
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      Anesthesia Plan  ASA Score- 3     Anesthesia Type- IV sedation with anesthesia with ASA Monitors.         Additional Monitors:     Airway Plan:            Plan Factors-Exercise tolerance (METS): >4 METS.    Chart reviewed. EKG reviewed. Imaging results reviewed. Existing labs reviewed. Patient summary reviewed.    Patient is not a current smoker.      Obstructive sleep apnea risk education given perioperatively.        Induction-     Postoperative Plan-     Informed Consent- Anesthetic plan and risks discussed with patient.  I personally reviewed this patient with the CRNA. Discussed and agreed on the Anesthesia Plan with the CRNA..

## 2024-03-12 DIAGNOSIS — E06.3 HYPOTHYROIDISM DUE TO HASHIMOTO'S THYROIDITIS: ICD-10-CM

## 2024-03-12 DIAGNOSIS — E03.8 HYPOTHYROIDISM DUE TO HASHIMOTO'S THYROIDITIS: ICD-10-CM

## 2024-03-12 NOTE — TELEPHONE ENCOUNTER
[facility] pt is completely out of medication. She has 3/13 appt with Dr Renner, but she also says the pharmacy usually has to order this medication as it's generally out of stock

## 2024-03-13 ENCOUNTER — OFFICE VISIT (OUTPATIENT)
Dept: ENDOCRINOLOGY | Facility: CLINIC | Age: 64
End: 2024-03-13
Payer: COMMERCIAL

## 2024-03-13 VITALS
DIASTOLIC BLOOD PRESSURE: 74 MMHG | BODY MASS INDEX: 21.09 KG/M2 | HEART RATE: 74 BPM | HEIGHT: 65 IN | SYSTOLIC BLOOD PRESSURE: 122 MMHG | OXYGEN SATURATION: 98 % | WEIGHT: 126.6 LBS

## 2024-03-13 DIAGNOSIS — E06.3 HYPOTHYROIDISM DUE TO HASHIMOTO'S THYROIDITIS: Primary | ICD-10-CM

## 2024-03-13 DIAGNOSIS — E03.8 HYPOTHYROIDISM DUE TO HASHIMOTO'S THYROIDITIS: Primary | ICD-10-CM

## 2024-03-13 PROCEDURE — 99214 OFFICE O/P EST MOD 30 MIN: CPT | Performed by: PHYSICIAN ASSISTANT

## 2024-03-13 PROCEDURE — 88305 TISSUE EXAM BY PATHOLOGIST: CPT | Performed by: PATHOLOGY

## 2024-03-13 RX ORDER — LEVOTHYROXINE SODIUM 112 UG/1
CAPSULE ORAL
Qty: 90 CAPSULE | Refills: 3 | Status: SHIPPED | OUTPATIENT
Start: 2024-03-13

## 2024-03-13 RX ORDER — LEVOTHYROXINE SODIUM 125 UG/1
125 CAPSULE ORAL DAILY
Qty: 90 CAPSULE | Refills: 0 | OUTPATIENT
Start: 2024-03-13 | End: 2024-06-11

## 2024-03-13 NOTE — TELEPHONE ENCOUNTER
Requested medication(s) are due for refill today: Yes  Patient has already received a courtesy refill: Yes  Other reason request has been forwarded to provider: Failed Protocol

## 2024-03-13 NOTE — PROGRESS NOTES
Established Patient Progress Note     CC: Endocrinology follow up visit    Impression & Plan:    Problem List Items Addressed This Visit        Endocrine    Hypothyroidism - Primary     Low TSH worsening, TSH suppressed x 2  Discussed risks of over replacement with Tirosint  Reduce Tirosint to 112mcg daily   Samples provided as medication typically needs to be ordered at pharmacy  Check TSH/Free T4 in 6 weeks.          Relevant Medications    Tirosint 112 MCG CAPS    Other Relevant Orders    TSH, 3rd generation    T4, free       History of Present Illness:     Tatyana Davis is a 64 y.o. female with a history of hypothyroidism.  She is currently taking Tirosint 125mcg daily.  Overall, she is feeling well from a thyroid standpoint but does report hair loss.  Denies tremors, anxiety, palpitations, or heat intolerance. Previously did not do well with levothyroxine tablets. She was hospitalized last month at [facility] for [facility].     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 without prior episode (HCC)   • Family history of early CAD   • PTSD (post-traumatic stress disorder)      Past Medical History:   Diagnosis Date   • Anxiety    • Basal cell carcinoma (BCC)     shoulder   • Cancer (HCC)     Right   • Chronic kidney disease    • Colon polyp    • Disease of thyroid gland    • Skin cancer of chest, excluding breast       Past Surgical History:   Procedure Laterality Date   • BREAST EXCISIONAL BIOPSY Right     age 28-lymph nodes-benign   • COLONOSCOPY     • HYSTERECTOMY  1993   • RENAL CRYOABLATION Right 08/30/2016      Family History   Problem Relation Age of  Onset   • Kidney disease Mother 32   • Lung cancer Father 76   • Anxiety disorder Sister    • No Known Problems Daughter    • No Known Problems Paternal Aunt    • Anxiety disorder Sister    • Anxiety disorder Sister    • No Known Problems Daughter    • Anxiety disorder Brother      Social History     Tobacco Use   • Smoking status: Never   • Smokeless tobacco: Never   Substance Use Topics   • Alcohol use: No     No Known Allergies    Current Outpatient Medications:   •  Ascorbic Acid (VITAMIN C PO), Vitamin C (ascorbate calcium), Disp: , Rfl:   •  atorvastatin (LIPITOR) 10 mg tablet, TAKE 1 TABLET BY MOUTH EVERY DAY AT NIGHT, Disp: , Rfl:   •  buPROPion (WELLBUTRIN XL) 150 mg 24 hr tablet, Take 400 mg by mouth in the morning, Disp: , Rfl:   •  cyclobenzaprine (FLEXERIL) 10 mg tablet, Take 10 mg by mouth daily at bedtime, Disp: , Rfl:   •  DULoxetine (CYMBALTA) 60 mg delayed release capsule, Take 120 mg by mouth daily, Disp: , Rfl:   •  estradiol (ESTRACE VAGINAL) 0.1 mg/g vaginal cream, Insert 1 g into the vagina 2 (two) times a week, Disp: 42.5 g, Rfl: 2  •  fluorouracil (EFUDEX) 5 % cream, PLEASE SEE ATTACHED FOR DETAILED DIRECTIONS, Disp: , Rfl:   •  gabapentin (NEURONTIN) 400 mg capsule, Take 400 mg by mouth 4 (four) times a day, Disp: , Rfl:   •  losartan (COZAAR) 25 mg tablet, Take 25 mg by mouth daily Patient taking 2 tabs daily, Disp: , Rfl: 0  •  Magnesium 100 MG TABS, Take by mouth, Disp: , Rfl:   •  methocarbamol (ROBAXIN) 750 mg tablet, Take 750 mg by mouth, Disp: , Rfl:   •  POTASSIUM CHLORIDE ER PO, Pill, Disp: , Rfl:   •  Tirosint 112 MCG CAPS, 1 cap daily, Disp: 90 capsule, Rfl: 3  •  traZODone (DESYREL) 100 mg tablet, , Disp: , Rfl:   •  vitamin E, tocopherol, 200 units capsule, Take 200 Units by mouth daily, Disp: , Rfl:     Review of Systems   Constitutional:  Negative for activity change, appetite change, chills, diaphoresis, fatigue, fever and unexpected weight change.   HENT:  Negative for  "trouble swallowing and voice change.    Eyes:  Negative for visual disturbance.   Respiratory:  Negative for shortness of breath.    Cardiovascular:  Negative for chest pain and palpitations.   Gastrointestinal:  Negative for abdominal pain, constipation and diarrhea.   Endocrine: Negative for cold intolerance, heat intolerance, polydipsia, polyphagia and polyuria.   Genitourinary:  Negative for frequency and menstrual problem.   Musculoskeletal:  Negative for arthralgias and myalgias.   Skin:  Negative for rash.   Allergic/Immunologic: Negative for food allergies.   Neurological:  Negative for dizziness and tremors.   Hematological:  Negative for adenopathy.   Psychiatric/Behavioral:  Negative for sleep disturbance.    All other systems reviewed and are negative.      Physical Exam:  Body mass index is 21.07 kg/m².  /74   Pulse 74   Ht 5' 5\" (1.651 m)   Wt 57.4 kg (126 lb 9.6 oz)   SpO2 98%   BMI 21.07 kg/m²    Wt Readings from Last 3 Encounters:   03/13/24 57.4 kg (126 lb 9.6 oz)   03/11/24 55.3 kg (122 lb)   03/06/24 58.5 kg (129 lb)       Physical Exam  Vitals reviewed.   Constitutional:       General: She is not in acute distress.     Appearance: Normal appearance. She is well-developed. She is not toxic-appearing.   HENT:      Head: Normocephalic and atraumatic.   Eyes:      Conjunctiva/sclera: Conjunctivae normal.      Pupils: Pupils are equal, round, and reactive to light.   Neck:      Thyroid: No thyromegaly.   Cardiovascular:      Rate and Rhythm: Normal rate and regular rhythm.      Heart sounds: Normal heart sounds.   Pulmonary:      Effort: Pulmonary effort is normal. No respiratory distress.      Breath sounds: Normal breath sounds. No wheezing or rales.   Abdominal:      General: Bowel sounds are normal. There is no distension.      Palpations: Abdomen is soft.      Tenderness: There is no abdominal tenderness.   Musculoskeletal:         General: Normal range of motion.      Cervical back: " Normal range of motion and neck supple.   Skin:     General: Skin is warm and dry.   Neurological:      Mental Status: She is alert and oriented to person, place, and time.   Psychiatric:         Mood and Affect: Mood normal.         Behavior: Behavior normal.         Labs:     Component      Latest Ref Rng 11/8/2022 9/11/2023 3/6/2024   TSH 3RD GENERATON      0.450 - 4.500 uIU/mL 0.438 (L)  <0.010 (L)  <0.010 (L)    FREE T4      0.61 - 1.12 ng/dL 0.91  0.98  0.99       Legend:  (L) Low    Discussed with the patient and all questioned fully answered. She will call me if any problems arise.    Follow-up appointment in 6 months.     Counseled patient on diagnostic results, prognosis, risk and benefit of treatment options, instruction for management, importance of treatment compliance, Risk  factor reduction and impressions    Cynthia Hill PA-C

## 2024-03-13 NOTE — ASSESSMENT & PLAN NOTE
Low TSH worsening, TSH suppressed x 2  Discussed risks of over replacement with Tirosint  Reduce Tirosint to 112mcg daily   Samples provided as medication typically needs to be ordered at pharmacy  Check TSH/Free T4 in 6 weeks.

## 2024-06-18 DIAGNOSIS — Z00.6 ENCOUNTER FOR EXAMINATION FOR NORMAL COMPARISON OR CONTROL IN CLINICAL RESEARCH PROGRAM: ICD-10-CM

## 2024-07-10 ENCOUNTER — APPOINTMENT (OUTPATIENT)
Dept: LAB | Facility: CLINIC | Age: 64
End: 2024-07-10

## 2024-07-10 DIAGNOSIS — Z00.6 ENCOUNTER FOR EXAMINATION FOR NORMAL COMPARISON OR CONTROL IN CLINICAL RESEARCH PROGRAM: ICD-10-CM

## 2024-07-10 PROCEDURE — 36415 COLL VENOUS BLD VENIPUNCTURE: CPT

## 2024-08-08 DIAGNOSIS — N95.2 ATROPHIC VAGINITIS: ICD-10-CM

## 2024-08-08 RX ORDER — ESTRADIOL 0.1 MG/G
CREAM VAGINAL
Qty: 42.5 G | Refills: 0 | Status: SHIPPED | OUTPATIENT
Start: 2024-08-08

## 2024-08-23 LAB
APOB+LDLR+PCSK9 GENE MUT ANL BLD/T: NOT DETECTED
BRCA1+BRCA2 DEL+DUP + FULL MUT ANL BLD/T: NOT DETECTED
MLH1+MSH2+MSH6+PMS2 GN DEL+DUP+FUL M: NOT DETECTED

## 2024-09-17 ENCOUNTER — OFFICE VISIT (OUTPATIENT)
Dept: ENDOCRINOLOGY | Facility: CLINIC | Age: 64
End: 2024-09-17
Payer: COMMERCIAL

## 2024-09-17 ENCOUNTER — LAB (OUTPATIENT)
Dept: LAB | Facility: CLINIC | Age: 64
End: 2024-09-17
Payer: COMMERCIAL

## 2024-09-17 VITALS
SYSTOLIC BLOOD PRESSURE: 110 MMHG | DIASTOLIC BLOOD PRESSURE: 80 MMHG | BODY MASS INDEX: 21.66 KG/M2 | HEIGHT: 65 IN | WEIGHT: 130 LBS | HEART RATE: 78 BPM

## 2024-09-17 DIAGNOSIS — E03.9 ACQUIRED HYPOTHYROIDISM: ICD-10-CM

## 2024-09-17 DIAGNOSIS — E03.9 ACQUIRED HYPOTHYROIDISM: Primary | ICD-10-CM

## 2024-09-17 DIAGNOSIS — E06.3 HYPOTHYROIDISM DUE TO HASHIMOTO'S THYROIDITIS: ICD-10-CM

## 2024-09-17 DIAGNOSIS — R73.09 ELEVATED GLUCOSE: ICD-10-CM

## 2024-09-17 DIAGNOSIS — E03.8 HYPOTHYROIDISM DUE TO HASHIMOTO'S THYROIDITIS: ICD-10-CM

## 2024-09-17 LAB
T4 FREE SERPL-MCNC: 1.08 NG/DL (ref 0.61–1.12)
TSH SERPL DL<=0.05 MIU/L-ACNC: <0.01 UIU/ML (ref 0.45–4.5)

## 2024-09-17 PROCEDURE — 99214 OFFICE O/P EST MOD 30 MIN: CPT | Performed by: INTERNAL MEDICINE

## 2024-09-17 PROCEDURE — 36415 COLL VENOUS BLD VENIPUNCTURE: CPT

## 2024-09-17 PROCEDURE — 84443 ASSAY THYROID STIM HORMONE: CPT

## 2024-09-17 PROCEDURE — 84439 ASSAY OF FREE THYROXINE: CPT

## 2024-09-17 RX ORDER — VORTIOXETINE 10 MG/1
10 TABLET, FILM COATED ORAL DAILY
COMMUNITY
Start: 2024-06-27

## 2024-09-17 NOTE — ASSESSMENT & PLAN NOTE
At her last visit, the Tirosint was decreased due to a suppressed TSH.  She has not had a chance to repeat laboratory testing.  I encouraged her to have this done.    Orders:    TSH, 3rd generation with Free T4 reflex; Future

## 2024-09-17 NOTE — RESULT ENCOUNTER NOTE
Since the TSH still remains suppressed, there could be a component of secondary hypothyroidism where the pituitary gland is not working properly.  Since you feel well and the free T4 is in the normal range, I would have you continue the current dose of Tirosint.

## 2024-09-17 NOTE — PROGRESS NOTES
Ambulatory Visit  Name: Tatyana Davis      : 1960      MRN: [id number]  Encounter Provider: Phillip Renner MD  Encounter Date: 2024   Encounter department: [facility] [place]    Assessment & Plan  Acquired hypothyroidism  At her last visit, the Tirosint was decreased due to a suppressed TSH.  She has not had a chance to repeat laboratory testing.  I encouraged her to have this done.    Orders:    TSH, 3rd generation with Free T4 reflex; Future    Elevated glucose  She is concerned about the elevated glucose.  She states that these laboratory testings were not fasting.  Therefore, I have ordered a hemoglobin A1c.  Orders:    Hemoglobin A1C; Future      History of Present Illness     Tatyana Davis is a 64 y.o. female who presents for follow-up of hypothyroidism.  She is currently on Tirosint.  She denies any symptoms of hypo or hyperthyroidism.    History obtained from : patient  Review of Systems   Constitutional:  Negative for chills and fever.   Respiratory:  Negative for shortness of breath.    Cardiovascular:  Negative for chest pain.   Gastrointestinal:  Negative for constipation, diarrhea, nausea and vomiting.   Endocrine: Negative for cold intolerance and heat intolerance.   All other systems reviewed and are negative.    Current Outpatient Medications on File Prior to Visit   Medication Sig Dispense Refill    Ascorbic Acid (VITAMIN C PO) Vitamin C (ascorbate calcium)      atorvastatin (LIPITOR) 10 mg tablet TAKE 1 TABLET BY MOUTH EVERY DAY AT NIGHT      buPROPion (WELLBUTRIN XL) 150 mg 24 hr tablet Take 150 mg by mouth every morning      cyclobenzaprine (FLEXERIL) 10 mg tablet Take 10 mg by mouth daily at bedtime      estradiol (ESTRACE) 0.1 mg/g vaginal cream INSERT 1 GRAM INTO THE VAGINA 2 TIMES A WEEK 42.5 g 0    fluorouracil (EFUDEX) 5 % cream PLEASE SEE ATTACHED FOR DETAILED DIRECTIONS      gabapentin (NEURONTIN) 400 mg capsule Take 400 mg by  "mouth 4 (four) times a day      losartan (COZAAR) 25 mg tablet Take 50 mg by mouth daily  0    POTASSIUM CHLORIDE ER PO Pill      Tirosint 112 MCG CAPS 1 cap daily 90 capsule 3    traZODone (DESYREL) 100 mg tablet       Trintellix 10 MG tablet Take 10 mg by mouth daily      vitamin E, tocopherol, 200 units capsule Take 200 Units by mouth daily      DULoxetine (CYMBALTA) 60 mg delayed release capsule Take 120 mg by mouth daily      Magnesium 100 MG TABS Take by mouth      methocarbamol (ROBAXIN) 750 mg tablet Take 750 mg by mouth       No current facility-administered medications on file prior to visit.          Objective     /80   Pulse 78   Ht 5' 5\" (1.651 m)   Wt 59 kg (130 lb)   BMI 21.63 kg/m²     Physical Exam  Vitals and nursing note reviewed.   Constitutional:       Appearance: Normal appearance.   HENT:      Head: Normocephalic and atraumatic.   Eyes:      General: No scleral icterus.        Right eye: No discharge.         Left eye: No discharge.   Pulmonary:      Effort: Pulmonary effort is normal.   Musculoskeletal:         General: Normal range of motion.      Cervical back: Normal range of motion.   Skin:     Coloration: Skin is not jaundiced.   Neurological:      General: No focal deficit present.      Mental Status: She is alert and oriented to person, place, and time.      Cranial Nerves: No cranial nerve deficit.   Psychiatric:         Mood and Affect: Mood normal.         Behavior: Behavior normal.         "

## 2024-10-28 ENCOUNTER — TELEPHONE (OUTPATIENT)
Age: 64
End: 2024-10-28

## 2024-10-28 NOTE — TELEPHONE ENCOUNTER
Pt called stating she does not need and appointment at this time and will call to schedule when needed.  
No

## 2024-11-20 ENCOUNTER — NURSE TRIAGE (OUTPATIENT)
Age: 64
End: 2024-11-20

## 2024-11-20 NOTE — TELEPHONE ENCOUNTER
Regarding: swallowing trouble, uneasy stomach, dry heaves  ----- Message from Mikaela KELLEY sent at 11/20/2024  9:15 AM EST -----  Pt calling with swallowing issues having trouble swallowing pills, pt has a uneasy feeling in her stomach and also dry heaves in the morning. Please call back to discuss

## 2024-11-20 NOTE — TELEPHONE ENCOUNTER
Called patient, no answer, left message requesting she call back so nursing can triage her symptoms.

## 2024-11-20 NOTE — TELEPHONE ENCOUNTER
"Patient having queasiness and nausea in the morning and randomly throughout the day.  Sometimes, upon laying down at bedside she has that feeling also.   Also having a sensation that the medication and food getting stuck.  Drinks a lot to get it the push down.   Urgent apt for Dec 3rd scheduled with RASHMI Cote.     We discuss trialing OTC famotide at bedtime.  Patient does admit to having reflux 20 years ago and needing a PPI.  States that she saw an ENT at that time who told her she didn't need any mediation and just needed to sleep with her HOB elevated which she does and stop drinking coffee and eating chocolate.   Patient states that she was on Nexium in the past and would like to try OTC Nexium in the morning first.          Answer Assessment - Initial Assessment Questions  1. NAUSEA SEVERITY: \"How bad is the nausea?\" (e.g., mild, moderate, severe; dehydration, weight loss)      Moderate   2. ONSET: \"When did the nausea begin?\"       sept  3. VOMITING: \"Any vomiting?\" If Yes, ask: \"How many times today?\" Denies         4. RECURRENT SYMPTOM: \"Have you had nausea before?\" If Yes, ask: \"When was the last time?\" \"What happened that time?\"  Unknown       5. CAUSE: \"What do you think is causing the nausea?\"  Unknown    Protocols used: Nausea-Adult-OH    "

## 2024-12-09 ENCOUNTER — OFFICE VISIT (OUTPATIENT)
Dept: INTERNAL MEDICINE CLINIC | Facility: CLINIC | Age: 64
End: 2024-12-09

## 2024-12-09 ENCOUNTER — TELEPHONE (OUTPATIENT)
Age: 64
End: 2024-12-09

## 2024-12-09 VITALS
HEART RATE: 87 BPM | SYSTOLIC BLOOD PRESSURE: 134 MMHG | OXYGEN SATURATION: 98 % | HEIGHT: 65 IN | BODY MASS INDEX: 21.29 KG/M2 | DIASTOLIC BLOOD PRESSURE: 86 MMHG | TEMPERATURE: 97.8 F | WEIGHT: 127.8 LBS

## 2024-12-09 DIAGNOSIS — E78.49 OTHER HYPERLIPIDEMIA: ICD-10-CM

## 2024-12-09 DIAGNOSIS — I10 PRIMARY HYPERTENSION: Primary | ICD-10-CM

## 2024-12-09 DIAGNOSIS — M47.22 OSTEOARTHRITIS OF SPINE WITH RADICULOPATHY, CERVICAL REGION: ICD-10-CM

## 2024-12-09 DIAGNOSIS — C64.1 CANCER OF KIDNEY, RIGHT (HCC): ICD-10-CM

## 2024-12-09 DIAGNOSIS — M47.896 OTHER OSTEOARTHRITIS OF SPINE, LUMBAR REGION: ICD-10-CM

## 2024-12-09 DIAGNOSIS — R92.8 ABNORMAL MAGNETIC RESONANCE IMAGING OF RIGHT BREAST: ICD-10-CM

## 2024-12-09 DIAGNOSIS — E03.9 ACQUIRED HYPOTHYROIDISM: ICD-10-CM

## 2024-12-09 PROCEDURE — 99203 OFFICE O/P NEW LOW 30 MIN: CPT | Performed by: FAMILY MEDICINE

## 2024-12-09 NOTE — ASSESSMENT & PLAN NOTE
Pt follows with endocrinology , Tirosint dose decreased in March to 112 mcg , due to supressed result x 2 follow up tsh still low , pt to continue same dose for now

## 2024-12-09 NOTE — ASSESSMENT & PLAN NOTE
Patient has been following with surg onc , mri has been monitored q 6 months x 2 years and all has been stable , pt saw [facility] provider last month , recommended to return to yearly mammogram

## 2024-12-09 NOTE — TELEPHONE ENCOUNTER
Attempted to reach patient to reschedule upcoming appointment, line disconnected. Called again, sent to voicemail. Per communication consent, left voicemail.

## 2024-12-09 NOTE — ASSESSMENT & PLAN NOTE
Continue lipitor increased to 40 mg by cardiology 9/2024   Orders:    Lipid panel; Future    Comprehensive metabolic panel; Future

## 2024-12-09 NOTE — PROGRESS NOTES
Name: Tatyana Davis      : 1960      MRN: [id number]  Encounter Provider: Shahida Morrell MD  Encounter Date: 2024   [facility]    Assessment & Plan  Acquired hypothyroidism  Pt follows with endocrinology , Tirosint dose decreased in March to 112 mcg , due to supressed result x 2 follow up tsh still low , pt to continue same dose for now        Other osteoarthritis of spine, lumbar region  Patient follows        Cancer of kidney, right (HCC)  Dx 2017 pt had cryoablation R kidney , follows with urology        Primary hypertension  BP well controlled on current med regimen , cozaar 50 mg 1 po q day        Abnormal magnetic resonance imaging of right breast  Patient has been following with surg onc , mri has been monitored q 6 months x 2 years and all has been stable , pt saw [facility] provider last month , recommended to return to yearly mammogram        Other hyperlipidemia  Continue lipitor increased to 40 mg by cardiology 2024   Orders:    Lipid panel; Future    Comprehensive metabolic panel; Future    Osteoarthritis of spine with radiculopathy, cervical region  Patient has been following with PM , neurosurgery she had been told she needed surgery she sought 2nd opinion with [facility] neurosurgeon Dr Cruz , he updated Cervical MRI and felt that there is no need for surgery he referred her for P.T , she has been attending with Bud PEÑA and this is going very well for her she feels so much better            History of Present Illness     HPINew patient to this office prior patient of min here to establish care , HTN, Hyperlipidemia , Hypothyroid , L breast mass , on MRI saw R breast monitored q 6 mo x 2 yrs , she has been following for her C spine saw neurosurgery Dr Cruz NYTOMI , had updated MRI , he recommended continued Pilates and Yoga , epidurals , she has been going to [facility]   She has been feeling well overall , she has been to   [facility] cardiology she is asking pt to get lipid and Cmp , apob a   Review of Systems   Constitutional:  Negative for chills and fever.   HENT:  Negative for ear pain and sore throat.    Eyes:  Negative for pain and visual disturbance.   Respiratory:  Negative for cough and shortness of breath.    Cardiovascular:  Negative for chest pain and palpitations.   Gastrointestinal:  Negative for abdominal pain and vomiting.   Genitourinary:  Negative for dysuria and hematuria.   Musculoskeletal:  Positive for back pain and neck pain. Negative for arthralgias.   Skin:  Negative for color change and rash.   Neurological:  Negative for seizures and syncope.   All other systems reviewed and are negative.    Past Medical History:   Diagnosis Date    Anxiety     Basal cell carcinoma (BCC)     shoulder    Cancer (HCC)     Right    Chronic kidney disease     Colon polyp     Disease of thyroid gland     Skin cancer of chest, excluding breast      Past Surgical History:   Procedure Laterality Date    BREAST EXCISIONAL BIOPSY Right     age 28-lymph nodes-benign    COLONOSCOPY      HYSTERECTOMY  1993    RENAL CRYOABLATION Right 08/30/2016     Family History   Problem Relation Age of Onset    Kidney disease Mother 32    Lung cancer Father 76    Anxiety disorder Sister     No Known Problems Daughter     No Known Problems Paternal Aunt     Anxiety disorder Sister     Anxiety disorder Sister     No Known Problems Daughter     Anxiety disorder Brother      Social History     Tobacco Use    Smoking status: Never    Smokeless tobacco: Never   Vaping Use    Vaping status: Never Used   Substance and Sexual Activity    Alcohol use: No    Drug use: No    Sexual activity: Yes     Partners: Male     Birth control/protection: Female Sterilization     Current Outpatient Medications on File Prior to Visit   Medication Sig    Ascorbic Acid (VITAMIN C PO) Vitamin C (ascorbate calcium)    atorvastatin (LIPITOR) 10 mg tablet TAKE 1 TABLET BY MOUTH EVERY  DAY AT NIGHT (Patient taking differently: Take 40 mg by mouth daily)    buPROPion (WELLBUTRIN XL) 150 mg 24 hr tablet Take 150 mg by mouth every morning    cyclobenzaprine (FLEXERIL) 10 mg tablet Take 10 mg by mouth daily at bedtime    estradiol (ESTRACE) 0.1 mg/g vaginal cream INSERT 1 GRAM INTO THE VAGINA 2 TIMES A WEEK    fluorouracil (EFUDEX) 5 % cream PLEASE SEE ATTACHED FOR DETAILED DIRECTIONS    gabapentin (NEURONTIN) 400 mg capsule Take 400 mg by mouth 4 (four) times a day    ibuprofen (MOTRIN) 200 mg tablet Take 200 mg by mouth every 6 (six) hours as needed    Tirosint 112 MCG CAPS 1 cap daily    traZODone (DESYREL) 100 mg tablet     Trintellix 10 MG tablet Take 10 mg by mouth daily    vitamin E, tocopherol, 200 units capsule Take 200 Units by mouth daily    POTASSIUM CHLORIDE ER PO Pill    [DISCONTINUED] DULoxetine (CYMBALTA) 60 mg delayed release capsule Take 120 mg by mouth daily    [DISCONTINUED] losartan (COZAAR) 25 mg tablet Take 50 mg by mouth daily    [DISCONTINUED] Magnesium 100 MG TABS Take by mouth    [DISCONTINUED] methocarbamol (ROBAXIN) 750 mg tablet Take 750 mg by mouth    [DISCONTINUED] oxyCODONE (ROXICODONE) 5 immediate release tablet PLEASE SEE ATTACHED FOR DETAILED DIRECTIONS (Patient not taking: Reported on 12/9/2024)     No Known Allergies  Immunization History   Administered Date(s) Administered    COVID-19 MODERNA VACC 0.5 ML IM 04/15/2021, 05/13/2021, 05/03/2022    COVID-19 Moderna mRNA Vaccine 12 Yr+ 50 mcg/0.5 mL (Spikevax) 11/01/2023, 10/01/2024    COVID-19 PFIZER VACCINE 0.3 ML IM 11/17/2022    COVID-19 Pfizer Vac BIVALENT Jeff-sucrose 12 Yr+ IM 11/17/2022    INFLUENZA 12/07/2012, 12/01/2014, 10/30/2017, 11/15/2017, 09/29/2018, 10/14/2019, 09/28/2021, 10/19/2022, 09/28/2023, 10/10/2024    Influenza, injectable, quadrivalent, preservative free 0.5 mL 10/06/2020    Influenza, seasonal, injectable, preservative free 10/01/2024    Tdap 07/04/2009, 11/15/2017    Zoster Vaccine  "Recombinant 10/06/2020, 06/29/2021     Objective   /86 (BP Location: Left arm, Patient Position: Sitting, Cuff Size: Standard)   Pulse 87   Temp 97.8 °F (36.6 °C) (Temporal)   Ht 5' 5\" (1.651 m)   Wt 58 kg (127 lb 12.8 oz)   SpO2 98%   BMI 21.27 kg/m²     Physical Exam  Vitals and nursing note reviewed.   Constitutional:       General: She is not in acute distress.     Appearance: She is well-developed.      Comments: Skin with good color turgor , well hydrated ,no distress noted     HENT:      Head: Normocephalic and atraumatic.      Right Ear: No decreased hearing noted.      Left Ear: No decreased hearing noted.      Mouth/Throat:      Pharynx: Oropharynx is clear.   Eyes:      Conjunctiva/sclera: Conjunctivae normal.   Neck:      Thyroid: No thyromegaly.   Cardiovascular:      Rate and Rhythm: Normal rate and regular rhythm.      Heart sounds: Normal heart sounds. No murmur heard.  Pulmonary:      Effort: Pulmonary effort is normal. No respiratory distress.      Breath sounds: Normal breath sounds.   Abdominal:      Palpations: Abdomen is soft.      Tenderness: There is no abdominal tenderness.   Musculoskeletal:         General: No swelling.      Cervical back: Neck supple. Decreased range of motion.      Lumbar back: Decreased range of motion.   Lymphadenopathy:      Cervical:      Right cervical: No superficial cervical adenopathy.     Left cervical: No superficial cervical adenopathy.   Skin:     General: Skin is warm and dry.      Capillary Refill: Capillary refill takes less than 2 seconds.   Neurological:      Mental Status: She is alert.      Comments: Non focal exam    Psychiatric:         Attention and Perception: Attention normal.         Mood and Affect: Mood normal.         Speech: Speech normal.         Behavior: Behavior normal.         "

## 2024-12-10 ENCOUNTER — TELEPHONE (OUTPATIENT)
Dept: INTERNAL MEDICINE CLINIC | Facility: CLINIC | Age: 64
End: 2024-12-10

## 2024-12-10 ENCOUNTER — TELEPHONE (OUTPATIENT)
Age: 64
End: 2024-12-10

## 2024-12-10 NOTE — ASSESSMENT & PLAN NOTE
Patient has been following with PM , neurosurgery she had been told she needed surgery she sought 2nd opinion with [facility] neurosurgeon Dr Cruz , he updated Cervical MRI and felt that there is no need for surgery he referred her for P.T , she has been attending with Bud PEÑA and this is going very well for her she feels so much better

## 2024-12-10 NOTE — TELEPHONE ENCOUNTER
Patient called in wanting to speak to clinical staff. She is going through some changes with her insurance soon and would like to know if Dr. Renner could possibly prescribe a generic of the Tirosint medication she is on. They are requiring a PA for the medication at the moment. Please call pt as soon as possible and advise. I was unable to reach a team member at this time. Thank you!

## 2024-12-10 NOTE — TELEPHONE ENCOUNTER
Patient called to report she spoke with Dr Benjamín Morrell yesterday during her visit about having the Lipo Protein A lab ordered for her. She went to the lab today and the phlebotomist reported that test wasn't ordered. Patient asking that test be ordered for her ASAP as she needs the results for Monday.

## 2024-12-11 DIAGNOSIS — E78.49 OTHER HYPERLIPIDEMIA: Primary | ICD-10-CM

## 2024-12-12 NOTE — TELEPHONE ENCOUNTER
Called and left detailed VM advising patient lab was put in. Per jose she was unsure if it ws exactly the one patient ws requesting, but it was the only lab order in our system.

## 2024-12-17 ENCOUNTER — TELEPHONE (OUTPATIENT)
Dept: ENDOCRINOLOGY | Facility: CLINIC | Age: 64
End: 2024-12-17

## 2024-12-17 NOTE — TELEPHONE ENCOUNTER
Reason for call:   [x] Refill   [] Prior Auth  [] Other:     Office:   [] PCP/Provider -   [] Specialty/Provider - Mary Kay Mckeon    Medication: Tirosint    Dose/Frequency: 112 mcg 1 cap daily     Quantity: 90    [place] [facility] [place]

## 2024-12-18 ENCOUNTER — TELEPHONE (OUTPATIENT)
Age: 64
End: 2024-12-18

## 2024-12-18 DIAGNOSIS — E06.3 HYPOTHYROIDISM DUE TO HASHIMOTO'S THYROIDITIS: ICD-10-CM

## 2024-12-18 RX ORDER — LEVOTHYROXINE SODIUM 112 UG/1
CAPSULE ORAL
Qty: 90 CAPSULE | Refills: 3 | Status: SHIPPED | OUTPATIENT
Start: 2024-12-18 | End: 2024-12-20

## 2024-12-18 NOTE — TELEPHONE ENCOUNTER
Patients insurance with cover levothyroxine sodium tablets vs capsules. Are you able to send tablets instead?    Please advise

## 2024-12-18 NOTE — TELEPHONE ENCOUNTER
Since Levothyroxine is not on the med list the refill team can't fill this. Please send to the pharmacy.

## 2024-12-19 RX ORDER — LEVOTHYROXINE SODIUM 112 UG/1
TABLET ORAL
Refills: 0 | OUTPATIENT
Start: 2024-12-19

## 2024-12-20 RX ORDER — LEVOTHYROXINE SODIUM 112 UG/1
112 TABLET ORAL DAILY
Qty: 100 TABLET | Refills: 3 | Status: SHIPPED | OUTPATIENT
Start: 2024-12-20 | End: 2026-01-24

## 2024-12-26 ENCOUNTER — TELEPHONE (OUTPATIENT)
Dept: ENDOCRINOLOGY | Facility: CLINIC | Age: 64
End: 2024-12-26

## 2024-12-26 ENCOUNTER — TELEPHONE (OUTPATIENT)
Age: 64
End: 2024-12-26

## 2024-12-26 NOTE — TELEPHONE ENCOUNTER
LMOM for patient to call us back to reschedule her appt scheduled on January 6th. Provided call back number

## 2024-12-27 ENCOUNTER — APPOINTMENT (OUTPATIENT)
Dept: LAB | Facility: CLINIC | Age: 64
End: 2024-12-27
Payer: COMMERCIAL

## 2024-12-27 DIAGNOSIS — E06.3 HYPOTHYROIDISM DUE TO HASHIMOTO'S THYROIDITIS: ICD-10-CM

## 2024-12-27 DIAGNOSIS — E78.49 OTHER HYPERLIPIDEMIA: ICD-10-CM

## 2024-12-27 DIAGNOSIS — R73.09 ELEVATED GLUCOSE: ICD-10-CM

## 2024-12-27 LAB
ALBUMIN SERPL BCG-MCNC: 4.5 G/DL (ref 3.5–5)
ALP SERPL-CCNC: 61 U/L (ref 34–104)
ALT SERPL W P-5'-P-CCNC: 49 U/L (ref 7–52)
ANION GAP SERPL CALCULATED.3IONS-SCNC: 8 MMOL/L (ref 4–13)
AST SERPL W P-5'-P-CCNC: 42 U/L (ref 13–39)
BILIRUB SERPL-MCNC: 0.36 MG/DL (ref 0.2–1)
BUN SERPL-MCNC: 14 MG/DL (ref 5–25)
CALCIUM SERPL-MCNC: 9.8 MG/DL (ref 8.4–10.2)
CHLORIDE SERPL-SCNC: 104 MMOL/L (ref 96–108)
CHOLEST SERPL-MCNC: 197 MG/DL (ref ?–200)
CO2 SERPL-SCNC: 27 MMOL/L (ref 21–32)
CREAT SERPL-MCNC: 0.8 MG/DL (ref 0.6–1.3)
EST. AVERAGE GLUCOSE BLD GHB EST-MCNC: 117 MG/DL
GFR SERPL CREATININE-BSD FRML MDRD: 78 ML/MIN/1.73SQ M
GLUCOSE P FAST SERPL-MCNC: 99 MG/DL (ref 65–99)
HBA1C MFR BLD: 5.7 %
HDLC SERPL-MCNC: 109 MG/DL
LDLC SERPL CALC-MCNC: 48 MG/DL (ref 0–100)
NONHDLC SERPL-MCNC: 88 MG/DL
POTASSIUM SERPL-SCNC: 4 MMOL/L (ref 3.5–5.3)
PROT SERPL-MCNC: 6.8 G/DL (ref 6.4–8.4)
SODIUM SERPL-SCNC: 139 MMOL/L (ref 135–147)
TRIGL SERPL-MCNC: 199 MG/DL (ref ?–150)
TSH SERPL DL<=0.05 MIU/L-ACNC: 0.03 UIU/ML (ref 0.45–4.5)

## 2024-12-27 PROCEDURE — 36415 COLL VENOUS BLD VENIPUNCTURE: CPT

## 2024-12-27 PROCEDURE — 80061 LIPID PANEL: CPT

## 2024-12-27 PROCEDURE — 83695 ASSAY OF LIPOPROTEIN(A): CPT

## 2024-12-27 PROCEDURE — 83036 HEMOGLOBIN GLYCOSYLATED A1C: CPT

## 2024-12-27 PROCEDURE — 80053 COMPREHEN METABOLIC PANEL: CPT

## 2024-12-27 PROCEDURE — 84443 ASSAY THYROID STIM HORMONE: CPT

## 2024-12-27 NOTE — TELEPHONE ENCOUNTER
Second attempt to contact the patient to reschedule. No answer. LMOM to call us to reschedule. [facility] message has been read and will send a letter to contact.

## 2024-12-30 ENCOUNTER — RESULTS FOLLOW-UP (OUTPATIENT)
Dept: ENDOCRINOLOGY | Facility: CLINIC | Age: 64
End: 2024-12-30

## 2024-12-30 LAB — LPA SERPL-SCNC: 17 NMOL/L

## 2024-12-30 NOTE — RESULT ENCOUNTER NOTE
TSH has improved but remains below target.  Recommend decreasing levothyroxine to 100 mcg/day.  Check TSH with reflex to free T4 in 6 weeks.

## 2024-12-31 ENCOUNTER — RESULTS FOLLOW-UP (OUTPATIENT)
Dept: INTERNAL MEDICINE CLINIC | Facility: CLINIC | Age: 64
End: 2024-12-31

## 2025-01-09 DIAGNOSIS — E06.3 HYPOTHYROIDISM DUE TO HASHIMOTO'S THYROIDITIS: ICD-10-CM

## 2025-01-10 RX ORDER — LEVOTHYROXINE SODIUM 100 UG/1
100 TABLET ORAL DAILY
Qty: 90 TABLET | Refills: 0 | Status: SHIPPED | OUTPATIENT
Start: 2025-01-10 | End: 2025-04-10

## 2025-01-13 DIAGNOSIS — N95.2 ATROPHIC VAGINITIS: ICD-10-CM

## 2025-01-14 RX ORDER — ESTRADIOL 0.1 MG/G
CREAM VAGINAL
Qty: 42.5 G | Refills: 1 | Status: SHIPPED | OUTPATIENT
Start: 2025-01-14

## 2025-01-17 ENCOUNTER — TELEPHONE (OUTPATIENT)
Dept: INTERNAL MEDICINE CLINIC | Facility: CLINIC | Age: 65
End: 2025-01-17

## 2025-02-05 ENCOUNTER — TELEPHONE (OUTPATIENT)
Dept: ENDOCRINOLOGY | Facility: CLINIC | Age: 65
End: 2025-02-05

## 2025-02-05 DIAGNOSIS — E03.9 ACQUIRED HYPOTHYROIDISM: Primary | ICD-10-CM

## 2025-02-05 DIAGNOSIS — E03.9 HYPOTHYROIDISM, UNSPECIFIED TYPE: ICD-10-CM

## 2025-02-05 DIAGNOSIS — E06.3 HYPOTHYROIDISM DUE TO HASHIMOTO'S THYROIDITIS: ICD-10-CM

## 2025-02-05 NOTE — TELEPHONE ENCOUNTER
Per Provider     I would recommend a repeat TSH and free T4.  Based on results, we can make additional changes to the medications.     Phillip Renner MD       Placed orders

## 2025-02-10 ENCOUNTER — TELEPHONE (OUTPATIENT)
Dept: ENDOCRINOLOGY | Facility: CLINIC | Age: 65
End: 2025-02-10

## 2025-02-21 LAB
T4 FREE SERPL-MCNC: 0.96 NG/DL (ref 0.61–1.12)
TSH SERPL-ACNC: 0.02 UIU/ML (ref 0.45–5.33)

## 2025-02-24 ENCOUNTER — RESULTS FOLLOW-UP (OUTPATIENT)
Dept: ENDOCRINOLOGY | Facility: CLINIC | Age: 65
End: 2025-02-24

## 2025-02-24 DIAGNOSIS — E06.3 HYPOTHYROIDISM DUE TO HASHIMOTO'S THYROIDITIS: Primary | ICD-10-CM

## 2025-02-24 NOTE — RESULT ENCOUNTER NOTE
The TSH is low.  Would recommend decreasing levothyroxine to 75 mcg/day.  Check TSH with reflex to free T4 in 6 weeks.

## 2025-02-25 DIAGNOSIS — E06.3 HYPOTHYROIDISM DUE TO HASHIMOTO'S THYROIDITIS: Primary | ICD-10-CM

## 2025-02-26 RX ORDER — LEVOTHYROXINE SODIUM 75 UG/1
75 TABLET ORAL DAILY
Qty: 90 TABLET | Refills: 0 | Status: SHIPPED | OUTPATIENT
Start: 2025-02-26

## 2025-03-22 DIAGNOSIS — E06.3 HYPOTHYROIDISM DUE TO HASHIMOTO'S THYROIDITIS: ICD-10-CM

## 2025-03-24 RX ORDER — LEVOTHYROXINE SODIUM 75 UG/1
75 TABLET ORAL DAILY
Qty: 90 TABLET | Refills: 1 | Status: SHIPPED | OUTPATIENT
Start: 2025-03-24

## 2025-04-07 ENCOUNTER — TELEPHONE (OUTPATIENT)
Dept: INTERNAL MEDICINE CLINIC | Facility: CLINIC | Age: 65
End: 2025-04-07

## 2025-04-07 NOTE — LETTER
[facility]  [address]  [place] [address]  Phone#  [phone]  Fax#  [phone]    Tatyana Davis  [address]  [place] [address]      April 7, 2025      Dear:   Tatyana Davis         Our office has attempted to contact you several times regarding your annual physical.  Could you please contact our office at [phone].    Thank you.     Sincerely,    [facility]